# Patient Record
Sex: FEMALE | Race: WHITE | Employment: OTHER | URBAN - METROPOLITAN AREA
[De-identification: names, ages, dates, MRNs, and addresses within clinical notes are randomized per-mention and may not be internally consistent; named-entity substitution may affect disease eponyms.]

---

## 2021-07-28 ENCOUNTER — APPOINTMENT (OUTPATIENT)
Dept: CT IMAGING | Age: 86
DRG: 871 | End: 2021-07-28
Payer: MEDICARE

## 2021-07-28 ENCOUNTER — HOSPITAL ENCOUNTER (INPATIENT)
Age: 86
LOS: 3 days | Discharge: NURSING, REHAB OR LONG-TERM CARE | DRG: 871 | End: 2021-07-31
Attending: PEDIATRICS | Admitting: PEDIATRICS
Payer: MEDICARE

## 2021-07-28 DIAGNOSIS — K81.0 ACUTE CHOLECYSTITIS: Primary | ICD-10-CM

## 2021-07-28 DIAGNOSIS — K85.90 ACUTE PANCREATITIS, UNSPECIFIED COMPLICATION STATUS, UNSPECIFIED PANCREATITIS TYPE: ICD-10-CM

## 2021-07-28 LAB
A/G RATIO: 1.5 (ref 1.1–2.2)
ALBUMIN SERPL-MCNC: 4.6 G/DL (ref 3.4–5)
ALP BLD-CCNC: 84 U/L (ref 40–129)
ALT SERPL-CCNC: 383 U/L (ref 10–40)
ANION GAP SERPL CALCULATED.3IONS-SCNC: 16 MMOL/L (ref 3–16)
AST SERPL-CCNC: 664 U/L (ref 15–37)
BASOPHILS ABSOLUTE: 0 K/UL (ref 0–0.2)
BASOPHILS RELATIVE PERCENT: 0.1 %
BILIRUB SERPL-MCNC: 0.8 MG/DL (ref 0–1)
BILIRUBIN URINE: NEGATIVE
BLOOD, URINE: ABNORMAL
BUN BLDV-MCNC: 24 MG/DL (ref 7–20)
CALCIUM SERPL-MCNC: 9.4 MG/DL (ref 8.3–10.6)
CHLORIDE BLD-SCNC: 95 MMOL/L (ref 99–110)
CLARITY: ABNORMAL
CO2: 22 MMOL/L (ref 21–32)
COLOR: YELLOW
CREAT SERPL-MCNC: 0.8 MG/DL (ref 0.6–1.2)
EOSINOPHILS ABSOLUTE: 0 K/UL (ref 0–0.6)
EOSINOPHILS RELATIVE PERCENT: 0 %
EPITHELIAL CELLS, UA: 1 /HPF (ref 0–5)
GFR AFRICAN AMERICAN: >60
GFR NON-AFRICAN AMERICAN: >60
GLOBULIN: 3.1 G/DL
GLUCOSE BLD-MCNC: 212 MG/DL (ref 70–99)
GLUCOSE URINE: >=1000 MG/DL
HCT VFR BLD CALC: 40.3 % (ref 36–48)
HEMOGLOBIN: 13.6 G/DL (ref 12–16)
HYALINE CASTS: 1 /LPF (ref 0–8)
KETONES, URINE: NEGATIVE MG/DL
LEUKOCYTE ESTERASE, URINE: NEGATIVE
LIPASE: >3000 U/L (ref 13–60)
LYMPHOCYTES ABSOLUTE: 0.5 K/UL (ref 1–5.1)
LYMPHOCYTES RELATIVE PERCENT: 3.6 %
MCH RBC QN AUTO: 31.4 PG (ref 26–34)
MCHC RBC AUTO-ENTMCNC: 33.7 G/DL (ref 31–36)
MCV RBC AUTO: 93.1 FL (ref 80–100)
MICROSCOPIC EXAMINATION: YES
MONOCYTES ABSOLUTE: 0.9 K/UL (ref 0–1.3)
MONOCYTES RELATIVE PERCENT: 6.2 %
NEUTROPHILS ABSOLUTE: 12.8 K/UL (ref 1.7–7.7)
NEUTROPHILS RELATIVE PERCENT: 90.1 %
NITRITE, URINE: NEGATIVE
PDW BLD-RTO: 12.9 % (ref 12.4–15.4)
PH UA: 6.5 (ref 5–8)
PLATELET # BLD: 292 K/UL (ref 135–450)
PMV BLD AUTO: 7.3 FL (ref 5–10.5)
POTASSIUM REFLEX MAGNESIUM: 4 MMOL/L (ref 3.5–5.1)
PROTEIN UA: 30 MG/DL
RBC # BLD: 4.33 M/UL (ref 4–5.2)
RBC UA: 18 /HPF (ref 0–4)
SODIUM BLD-SCNC: 133 MMOL/L (ref 136–145)
SPECIFIC GRAVITY UA: 1.02 (ref 1–1.03)
TOTAL PROTEIN: 7.7 G/DL (ref 6.4–8.2)
URINE REFLEX TO CULTURE: ABNORMAL
URINE TYPE: ABNORMAL
UROBILINOGEN, URINE: 1 E.U./DL
WBC # BLD: 14.2 K/UL (ref 4–11)
WBC UA: 5 /HPF (ref 0–5)

## 2021-07-28 PROCEDURE — 81001 URINALYSIS AUTO W/SCOPE: CPT

## 2021-07-28 PROCEDURE — 85025 COMPLETE CBC W/AUTO DIFF WBC: CPT

## 2021-07-28 PROCEDURE — 2500000003 HC RX 250 WO HCPCS: Performed by: PHYSICIAN ASSISTANT

## 2021-07-28 PROCEDURE — 83690 ASSAY OF LIPASE: CPT

## 2021-07-28 PROCEDURE — 99284 EMERGENCY DEPT VISIT MOD MDM: CPT

## 2021-07-28 PROCEDURE — 1200000000 HC SEMI PRIVATE

## 2021-07-28 PROCEDURE — 96365 THER/PROPH/DIAG IV INF INIT: CPT

## 2021-07-28 PROCEDURE — 74177 CT ABD & PELVIS W/CONTRAST: CPT

## 2021-07-28 PROCEDURE — 96375 TX/PRO/DX INJ NEW DRUG ADDON: CPT

## 2021-07-28 PROCEDURE — 6360000004 HC RX CONTRAST MEDICATION: Performed by: PHYSICIAN ASSISTANT

## 2021-07-28 PROCEDURE — 80053 COMPREHEN METABOLIC PANEL: CPT

## 2021-07-28 PROCEDURE — 6360000002 HC RX W HCPCS: Performed by: PHYSICIAN ASSISTANT

## 2021-07-28 RX ORDER — MECLIZINE HYDROCHLORIDE 25 MG/1
25 TABLET ORAL ONCE
COMMUNITY
End: 2021-07-28

## 2021-07-28 RX ORDER — CIPROFLOXACIN 2 MG/ML
400 INJECTION, SOLUTION INTRAVENOUS ONCE
Status: COMPLETED | OUTPATIENT
Start: 2021-07-28 | End: 2021-07-29

## 2021-07-28 RX ORDER — MORPHINE SULFATE 2 MG/ML
2 INJECTION, SOLUTION INTRAMUSCULAR; INTRAVENOUS ONCE
Status: COMPLETED | OUTPATIENT
Start: 2021-07-28 | End: 2021-07-28

## 2021-07-28 RX ORDER — ACETAMINOPHEN 500 MG
1000 TABLET ORAL EVERY 8 HOURS PRN
COMMUNITY

## 2021-07-28 RX ORDER — TRAMADOL HYDROCHLORIDE 50 MG/1
50 TABLET ORAL 2 TIMES DAILY PRN
Status: ON HOLD | COMMUNITY
End: 2021-07-31 | Stop reason: SDUPTHER

## 2021-07-28 RX ORDER — ALENDRONATE SODIUM 70 MG/1
70 TABLET ORAL
Status: ON HOLD | COMMUNITY
End: 2021-08-26 | Stop reason: HOSPADM

## 2021-07-28 RX ORDER — DIMENHYDRINATE 50 MG/1
50 TABLET ORAL ONCE
COMMUNITY

## 2021-07-28 RX ORDER — ELECTROLYTES/DEXTROSE
1 SOLUTION, ORAL ORAL ONCE
Status: ON HOLD | COMMUNITY
End: 2021-08-26 | Stop reason: HOSPADM

## 2021-07-28 RX ORDER — ONDANSETRON 2 MG/ML
4 INJECTION INTRAMUSCULAR; INTRAVENOUS ONCE
Status: COMPLETED | OUTPATIENT
Start: 2021-07-28 | End: 2021-07-28

## 2021-07-28 RX ORDER — CETIRIZINE HYDROCHLORIDE 10 MG/1
10 TABLET ORAL DAILY
COMMUNITY

## 2021-07-28 RX ADMIN — MORPHINE SULFATE 2 MG: 2 INJECTION, SOLUTION INTRAMUSCULAR; INTRAVENOUS at 20:34

## 2021-07-28 RX ADMIN — ONDANSETRON 4 MG: 2 INJECTION INTRAMUSCULAR; INTRAVENOUS at 20:34

## 2021-07-28 RX ADMIN — IOPAMIDOL 75 ML: 755 INJECTION, SOLUTION INTRAVENOUS at 20:43

## 2021-07-28 RX ADMIN — METRONIDAZOLE 500 MG: 500 INJECTION, SOLUTION INTRAVENOUS at 22:40

## 2021-07-28 RX ADMIN — CIPROFLOXACIN 400 MG: 2 INJECTION, SOLUTION INTRAVENOUS at 23:43

## 2021-07-28 ASSESSMENT — ENCOUNTER SYMPTOMS
ABDOMINAL PAIN: 1
DIARRHEA: 0
NAUSEA: 1
SHORTNESS OF BREATH: 0
COLOR CHANGE: 0
VOMITING: 1

## 2021-07-28 ASSESSMENT — PAIN DESCRIPTION - LOCATION: LOCATION: ABDOMEN

## 2021-07-28 ASSESSMENT — PAIN DESCRIPTION - PAIN TYPE: TYPE: ACUTE PAIN

## 2021-07-28 ASSESSMENT — PAIN SCALES - WONG BAKER: WONGBAKER_NUMERICALRESPONSE: 6

## 2021-07-28 ASSESSMENT — PAIN SCALES - GENERAL
PAINLEVEL_OUTOF10: 5
PAINLEVEL_OUTOF10: 5

## 2021-07-29 ENCOUNTER — APPOINTMENT (OUTPATIENT)
Dept: ULTRASOUND IMAGING | Age: 86
DRG: 871 | End: 2021-07-29
Payer: MEDICARE

## 2021-07-29 LAB
A/G RATIO: 1.7 (ref 1.1–2.2)
ABO/RH: NORMAL
ALBUMIN SERPL-MCNC: 4.1 G/DL (ref 3.4–5)
ALP BLD-CCNC: 74 U/L (ref 40–129)
ALT SERPL-CCNC: 748 U/L (ref 10–40)
ANION GAP SERPL CALCULATED.3IONS-SCNC: 9 MMOL/L (ref 3–16)
ANTIBODY SCREEN: NORMAL
AST SERPL-CCNC: 768 U/L (ref 15–37)
BASOPHILS ABSOLUTE: 0 K/UL (ref 0–0.2)
BASOPHILS RELATIVE PERCENT: 0.3 %
BILIRUB SERPL-MCNC: 0.6 MG/DL (ref 0–1)
BUN BLDV-MCNC: 15 MG/DL (ref 7–20)
CALCIUM SERPL-MCNC: 9.1 MG/DL (ref 8.3–10.6)
CHLORIDE BLD-SCNC: 101 MMOL/L (ref 99–110)
CO2: 28 MMOL/L (ref 21–32)
CREAT SERPL-MCNC: 0.6 MG/DL (ref 0.6–1.2)
EOSINOPHILS ABSOLUTE: 0 K/UL (ref 0–0.6)
EOSINOPHILS RELATIVE PERCENT: 0.2 %
GFR AFRICAN AMERICAN: >60
GFR NON-AFRICAN AMERICAN: >60
GLOBULIN: 2.4 G/DL
GLUCOSE BLD-MCNC: 118 MG/DL (ref 70–99)
HCT VFR BLD CALC: 35.9 % (ref 36–48)
HEMOGLOBIN: 12.3 G/DL (ref 12–16)
INR BLD: 1.03 (ref 0.88–1.12)
LACTIC ACID: 1.3 MMOL/L (ref 0.4–2)
LACTIC ACID: 3.6 MMOL/L (ref 0.4–2)
LIPASE: 728 U/L (ref 13–60)
LYMPHOCYTES ABSOLUTE: 1.2 K/UL (ref 1–5.1)
LYMPHOCYTES RELATIVE PERCENT: 13.7 %
MCH RBC QN AUTO: 31.5 PG (ref 26–34)
MCHC RBC AUTO-ENTMCNC: 34.1 G/DL (ref 31–36)
MCV RBC AUTO: 92.2 FL (ref 80–100)
MONOCYTES ABSOLUTE: 0.7 K/UL (ref 0–1.3)
MONOCYTES RELATIVE PERCENT: 7.8 %
NEUTROPHILS ABSOLUTE: 6.7 K/UL (ref 1.7–7.7)
NEUTROPHILS RELATIVE PERCENT: 78 %
PDW BLD-RTO: 12.5 % (ref 12.4–15.4)
PLATELET # BLD: 257 K/UL (ref 135–450)
PMV BLD AUTO: 7.3 FL (ref 5–10.5)
POTASSIUM REFLEX MAGNESIUM: 3.8 MMOL/L (ref 3.5–5.1)
PROCALCITONIN: 0.23 NG/ML (ref 0–0.15)
PROTHROMBIN TIME: 11.7 SEC (ref 9.9–12.7)
RBC # BLD: 3.9 M/UL (ref 4–5.2)
SODIUM BLD-SCNC: 138 MMOL/L (ref 136–145)
TOTAL PROTEIN: 6.5 G/DL (ref 6.4–8.2)
WBC # BLD: 8.6 K/UL (ref 4–11)

## 2021-07-29 PROCEDURE — 99222 1ST HOSP IP/OBS MODERATE 55: CPT | Performed by: SURGERY

## 2021-07-29 PROCEDURE — 76705 ECHO EXAM OF ABDOMEN: CPT

## 2021-07-29 PROCEDURE — 80053 COMPREHEN METABOLIC PANEL: CPT

## 2021-07-29 PROCEDURE — 6370000000 HC RX 637 (ALT 250 FOR IP): Performed by: INTERNAL MEDICINE

## 2021-07-29 PROCEDURE — 84145 PROCALCITONIN (PCT): CPT

## 2021-07-29 PROCEDURE — 2500000003 HC RX 250 WO HCPCS: Performed by: NURSE PRACTITIONER

## 2021-07-29 PROCEDURE — 86901 BLOOD TYPING SEROLOGIC RH(D): CPT

## 2021-07-29 PROCEDURE — 86900 BLOOD TYPING SEROLOGIC ABO: CPT

## 2021-07-29 PROCEDURE — 6360000002 HC RX W HCPCS: Performed by: NURSE PRACTITIONER

## 2021-07-29 PROCEDURE — 85025 COMPLETE CBC W/AUTO DIFF WBC: CPT

## 2021-07-29 PROCEDURE — 85610 PROTHROMBIN TIME: CPT

## 2021-07-29 PROCEDURE — 94761 N-INVAS EAR/PLS OXIMETRY MLT: CPT

## 2021-07-29 PROCEDURE — 1200000000 HC SEMI PRIVATE

## 2021-07-29 PROCEDURE — 36415 COLL VENOUS BLD VENIPUNCTURE: CPT

## 2021-07-29 PROCEDURE — 83605 ASSAY OF LACTIC ACID: CPT

## 2021-07-29 PROCEDURE — APPSS180 APP SPLIT SHARED TIME > 60 MINUTES: Performed by: NURSE PRACTITIONER

## 2021-07-29 PROCEDURE — 86850 RBC ANTIBODY SCREEN: CPT

## 2021-07-29 PROCEDURE — 2580000003 HC RX 258: Performed by: NURSE PRACTITIONER

## 2021-07-29 PROCEDURE — 83690 ASSAY OF LIPASE: CPT

## 2021-07-29 RX ORDER — SODIUM CHLORIDE 0.9 % (FLUSH) 0.9 %
5-40 SYRINGE (ML) INJECTION PRN
Status: DISCONTINUED | OUTPATIENT
Start: 2021-07-29 | End: 2021-07-31 | Stop reason: HOSPADM

## 2021-07-29 RX ORDER — POLYETHYLENE GLYCOL 3350 17 G/17G
17 POWDER, FOR SOLUTION ORAL DAILY PRN
Status: DISCONTINUED | OUTPATIENT
Start: 2021-07-29 | End: 2021-07-31 | Stop reason: HOSPADM

## 2021-07-29 RX ORDER — CIPROFLOXACIN 2 MG/ML
400 INJECTION, SOLUTION INTRAVENOUS EVERY 12 HOURS
Status: DISCONTINUED | OUTPATIENT
Start: 2021-07-29 | End: 2021-07-31 | Stop reason: HOSPADM

## 2021-07-29 RX ORDER — ONDANSETRON 2 MG/ML
4 INJECTION INTRAMUSCULAR; INTRAVENOUS EVERY 6 HOURS PRN
Status: DISCONTINUED | OUTPATIENT
Start: 2021-07-29 | End: 2021-07-31 | Stop reason: HOSPADM

## 2021-07-29 RX ORDER — SODIUM CHLORIDE 9 MG/ML
25 INJECTION, SOLUTION INTRAVENOUS PRN
Status: DISCONTINUED | OUTPATIENT
Start: 2021-07-29 | End: 2021-07-31 | Stop reason: HOSPADM

## 2021-07-29 RX ORDER — MORPHINE SULFATE 2 MG/ML
2 INJECTION, SOLUTION INTRAMUSCULAR; INTRAVENOUS EVERY 4 HOURS PRN
Status: DISCONTINUED | OUTPATIENT
Start: 2021-07-29 | End: 2021-07-30

## 2021-07-29 RX ORDER — ACETAMINOPHEN 650 MG/1
650 SUPPOSITORY RECTAL EVERY 6 HOURS PRN
Status: DISCONTINUED | OUTPATIENT
Start: 2021-07-29 | End: 2021-07-30

## 2021-07-29 RX ORDER — SODIUM CHLORIDE, SODIUM LACTATE, POTASSIUM CHLORIDE, CALCIUM CHLORIDE 600; 310; 30; 20 MG/100ML; MG/100ML; MG/100ML; MG/100ML
INJECTION, SOLUTION INTRAVENOUS CONTINUOUS
Status: DISCONTINUED | OUTPATIENT
Start: 2021-07-29 | End: 2021-07-31

## 2021-07-29 RX ORDER — ACETAMINOPHEN 325 MG/1
650 TABLET ORAL EVERY 6 HOURS PRN
Status: DISCONTINUED | OUTPATIENT
Start: 2021-07-29 | End: 2021-07-30

## 2021-07-29 RX ORDER — SODIUM CHLORIDE 0.9 % (FLUSH) 0.9 %
5-40 SYRINGE (ML) INJECTION EVERY 12 HOURS SCHEDULED
Status: DISCONTINUED | OUTPATIENT
Start: 2021-07-29 | End: 2021-07-31 | Stop reason: HOSPADM

## 2021-07-29 RX ORDER — QUETIAPINE FUMARATE 100 MG/1
100 TABLET, FILM COATED ORAL NIGHTLY
Status: DISCONTINUED | OUTPATIENT
Start: 2021-07-29 | End: 2021-07-30

## 2021-07-29 RX ORDER — NALOXONE HYDROCHLORIDE 0.4 MG/ML
0.4 INJECTION, SOLUTION INTRAMUSCULAR; INTRAVENOUS; SUBCUTANEOUS PRN
Status: DISCONTINUED | OUTPATIENT
Start: 2021-07-29 | End: 2021-07-31 | Stop reason: HOSPADM

## 2021-07-29 RX ADMIN — SODIUM CHLORIDE, POTASSIUM CHLORIDE, SODIUM LACTATE AND CALCIUM CHLORIDE: 600; 310; 30; 20 INJECTION, SOLUTION INTRAVENOUS at 01:28

## 2021-07-29 RX ADMIN — CIPROFLOXACIN 400 MG: 2 INJECTION, SOLUTION INTRAVENOUS at 09:16

## 2021-07-29 RX ADMIN — QUETIAPINE FUMARATE 100 MG: 100 TABLET ORAL at 20:29

## 2021-07-29 RX ADMIN — ENOXAPARIN SODIUM 40 MG: 40 INJECTION SUBCUTANEOUS at 07:59

## 2021-07-29 RX ADMIN — SODIUM CHLORIDE, POTASSIUM CHLORIDE, SODIUM LACTATE AND CALCIUM CHLORIDE: 600; 310; 30; 20 INJECTION, SOLUTION INTRAVENOUS at 07:57

## 2021-07-29 RX ADMIN — CIPROFLOXACIN 400 MG: 2 INJECTION, SOLUTION INTRAVENOUS at 20:36

## 2021-07-29 RX ADMIN — Medication 10 ML: at 07:59

## 2021-07-29 RX ADMIN — METRONIDAZOLE 500 MG: 500 INJECTION, SOLUTION INTRAVENOUS at 07:58

## 2021-07-29 RX ADMIN — SODIUM CHLORIDE, POTASSIUM CHLORIDE, SODIUM LACTATE AND CALCIUM CHLORIDE: 600; 310; 30; 20 INJECTION, SOLUTION INTRAVENOUS at 22:46

## 2021-07-29 RX ADMIN — METRONIDAZOLE 500 MG: 500 INJECTION, SOLUTION INTRAVENOUS at 16:58

## 2021-07-29 RX ADMIN — Medication 10 ML: at 20:29

## 2021-07-29 RX ADMIN — SODIUM CHLORIDE 25 ML: 9 INJECTION, SOLUTION INTRAVENOUS at 09:15

## 2021-07-29 ASSESSMENT — PAIN SCALES - PAIN ASSESSMENT IN ADVANCED DEMENTIA (PAINAD)
FACIALEXPRESSION: 0
BREATHING: 0
NEGVOCALIZATION: 0
BODYLANGUAGE: 0
BREATHING: 0
BODYLANGUAGE: 0
TOTALSCORE: 0
NEGVOCALIZATION: 0
FACIALEXPRESSION: 0
CONSOLABILITY: 0
BODYLANGUAGE: 0
BREATHING: 0
TOTALSCORE: 0
FACIALEXPRESSION: 0
TOTALSCORE: 0
TOTALSCORE: 0
FACIALEXPRESSION: 0
CONSOLABILITY: 0
CONSOLABILITY: 0
FACIALEXPRESSION: 0
NEGVOCALIZATION: 0
CONSOLABILITY: 0
TOTALSCORE: 0
BODYLANGUAGE: 0
BODYLANGUAGE: 0
NEGVOCALIZATION: 0
BREATHING: 0
BREATHING: 0
NEGVOCALIZATION: 0
CONSOLABILITY: 0

## 2021-07-29 ASSESSMENT — PAIN SCALES - WONG BAKER
WONGBAKER_NUMERICALRESPONSE: 2

## 2021-07-29 ASSESSMENT — PAIN DESCRIPTION - LOCATION: LOCATION: ABDOMEN

## 2021-07-29 ASSESSMENT — PAIN DESCRIPTION - PAIN TYPE: TYPE: ACUTE PAIN

## 2021-07-29 ASSESSMENT — PAIN SCALES - GENERAL
PAINLEVEL_OUTOF10: 0
PAINLEVEL_OUTOF10: 0

## 2021-07-29 NOTE — PLAN OF CARE
Problem: Falls - Risk of:  Goal: Will remain free from falls  Description: Will remain free from falls  Outcome: Ongoing  Goal: Absence of physical injury  Description: Absence of physical injury  Outcome: Ongoing     Problem: Confusion - Acute:  Goal: Absence of continued neurological deterioration signs and symptoms  Description: Absence of continued neurological deterioration signs and symptoms  Outcome: Ongoing  Goal: Mental status will be restored to baseline  Description: Mental status will be restored to baseline  Outcome: Ongoing     Problem: Discharge Planning:  Goal: Ability to perform activities of daily living will improve  Description: Ability to perform activities of daily living will improve  Outcome: Ongoing  Goal: Participates in care planning  Description: Participates in care planning  Outcome: Ongoing     Problem: Injury - Risk of, Physical Injury:  Goal: Will remain free from falls  Description: Will remain free from falls  Outcome: Ongoing  Goal: Absence of physical injury  Description: Absence of physical injury  Outcome: Ongoing     Problem: Mood - Altered:  Goal: Mood stable  Description: Mood stable  Outcome: Ongoing  Goal: Absence of abusive behavior  Description: Absence of abusive behavior  Outcome: Ongoing  Goal: Verbalizations of feeling emotionally comfortable while being cared for will increase  Description: Verbalizations of feeling emotionally comfortable while being cared for will increase  Outcome: Ongoing     Problem: Psychomotor Activity - Altered:  Goal: Absence of psychomotor disturbance signs and symptoms  Description: Absence of psychomotor disturbance signs and symptoms  Outcome: Ongoing     Problem: Sensory Perception - Impaired:  Goal: Demonstrations of improved sensory functioning will increase  Description: Demonstrations of improved sensory functioning will increase  Outcome: Ongoing  Goal: Decrease in sensory misperception frequency  Description: Decrease in sensory misperception frequency  Outcome: Ongoing  Goal: Able to refrain from responding to false sensory perceptions  Description: Able to refrain from responding to false sensory perceptions  Outcome: Ongoing  Goal: Demonstrates accurate environmental perceptions  Description: Demonstrates accurate environmental perceptions  Outcome: Ongoing  Goal: Able to distinguish between reality-based and nonreality-based thinking  Description: Able to distinguish between reality-based and nonreality-based thinking  Outcome: Ongoing  Goal: Able to interrupt nonreality-based thinking  Description: Able to interrupt nonreality-based thinking  Outcome: Ongoing     Problem: Sleep Pattern Disturbance:  Goal: Appears well-rested  Description: Appears well-rested  Outcome: Ongoing     Problem: Infection:  Goal: Will remain free from infection  Description: Will remain free from infection  Outcome: Ongoing     Problem: Safety:  Goal: Free from accidental physical injury  Description: Free from accidental physical injury  Outcome: Ongoing  Goal: Free from intentional harm  Description: Free from intentional harm  Outcome: Ongoing     Problem: Daily Care:  Goal: Daily care needs are met  Description: Daily care needs are met  Outcome: Ongoing     Problem: Pain:  Goal: Patient's pain/discomfort is manageable  Description: Patient's pain/discomfort is manageable  Outcome: Ongoing     Problem: Skin Integrity:  Goal: Skin integrity will stabilize  Description: Skin integrity will stabilize  Outcome: Ongoing     Problem: Discharge Planning:  Goal: Patients continuum of care needs are met  Description: Patients continuum of care needs are met  Outcome: Ongoing     Problem: Breathing Pattern - Ineffective:  Goal: Ability to achieve and maintain a regular respiratory rate will improve  Description: Ability to achieve and maintain a regular respiratory rate will improve  Outcome: Ongoing

## 2021-07-29 NOTE — PROGRESS NOTES
Hospitalist Progress Note      PCP: Shakir Maynard    Date of Admission: 7/28/2021    Chief Complaint: abdominal pain and vomiting    Hospital Course:     Patient is a 80year old female with medical history of dementia that presents to [facility] from her residence in assisted living at [facility] due to complaints of vomiting and abdominal pain. Lipase, AST/ALT, alk phos elevated in ED. WBC elevated with left shift and neutrophils 12.8. CT of abdomen and pelvis indicate gallbladder wall thickening and trace pericholecystic fluid and dilation. Bilateral ovarian cysts noted. General surgery was consulted and recommended admission with IV ciprofloxacin and metronidazole for acute cholecystitis. 7/29- ALT/AST elevated. WBC down to 8.6. Neutrophils down to 6.7. Lactic acidosis resolved to 1.3.   RUQ U/S indicates presence of gallstones in biliary system, with trace pericholecystic fluid. Impression of cholelithiasis and cholecystitis. General surgery consult pending. Subjective:     Patient is alert to self but not time or place. Confused as to why she is in the hospital and why she cannot leave. She would like to be allowed up and moving in order to keep her back pain decreased. States she normally walks all day long at the assisted living. Patients sons explained that she has chronic back pain due to spinal stenosis. Denies abdominal pain, nausea, vomiting, fever, chills, diarrhea, or constipation.      Medications:  Reviewed    Infusion Medications    sodium chloride Stopped (07/30/21 0538)    lactated ringers 100 mL/hr at 07/29/21 7977     Scheduled Medications    sodium chloride flush  5-40 mL Intravenous 2 times per day    enoxaparin  40 mg Subcutaneous Daily    ciprofloxacin  400 mg Intravenous Q12H    metroNIDAZOLE  500 mg Intravenous Q8H    QUEtiapine  100 mg Oral Nightly     PRN Meds: sodium chloride flush, sodium chloride, polyethylene glycol, acetaminophen **OR** acetaminophen, ondansetron, morphine, naloxone      Intake/Output Summary (Last 24 hours) at 7/30/2021 1346  Last data filed at 7/30/2021 0753  Gross per 24 hour   Intake 1535.92 ml   Output 2700 ml   Net -1164.08 ml       Physical Exam Performed:    BP (!) 168/72   Pulse 62   Temp 97.6 °F (36.4 °C) (Oral)   Resp 18   Ht 5' 1\" (1.549 m)   Wt 139 lb 12.4 oz (63.4 kg)   SpO2 93%   BMI 26.41 kg/m²     General appearance: No apparent distress, appears stated age and cooperative. HEENT: Pupils equal, round, and reactive to light. Conjunctivae/corneas clear. Neck: Supple, with full range of motion. No jugular venous distention. Trachea midline. Respiratory:  Normal respiratory effort. Clear to auscultation, bilaterally without Rales/Wheezes/Rhonchi. Cardiovascular: Regular rate and rhythm with normal S1/S2 without murmurs, rubs or gallops. Abdomen: Soft, non-tender, non-distended with normal bowel sounds. Musculoskeletal: No clubbing, cyanosis or edema bilaterally. Full range of motion without deformity. Skin: Skin color, texture, turgor normal.  No rashes or lesions. Neurologic:  Neurovascularly intact without any focal sensory/motor deficits. Cranial nerves: II-XII intact, grossly non-focal.  Psychiatric: Alert and oriented only to self.    Capillary Refill: Brisk,< 3 seconds   Peripheral Pulses: +2 palpable, equal bilaterally       Labs:   Recent Labs     07/28/21 1921 07/29/21  0709 07/30/21  0556   WBC 14.2* 8.6 9.5   HGB 13.6 12.3 13.0   HCT 40.3 35.9* 37.8    257 274     Recent Labs     07/28/21 1921 07/29/21  0709 07/30/21  0556   * 138 138   K 4.0 3.8 3.6   CL 95* 101 102   CO2 22 28 26   BUN 24* 15 10   CREATININE 0.8 0.6 0.6   CALCIUM 9.4 9.1 9.3     Recent Labs     07/28/21 1921 07/29/21  0709 07/30/21  0556   * 768* 192*   * 748* 437*   BILITOT 0.8 0.6 0.6   ALKPHOS 84 74 64     Recent Labs     07/29/21  0709   INR 1.03     No results for input(s): CKTOTAL, TROPONINI in the last 72 hours. Urinalysis:      Lab Results   Component Value Date    NITRU Negative 07/28/2021    WBCUA 5 07/28/2021    RBCUA 18 07/28/2021    BLOODU MODERATE 07/28/2021    SPECGRAV 1.021 07/28/2021    GLUCOSEU >=1000 07/28/2021       Radiology:  [address]   Final Result   Cholelithiasis. There is gallbladder wall thickening and trace   pericholecystic fluid suggesting cholecystitis. Extrahepatic common duct is   borderline dilated. CT ABDOMEN PELVIS W IV CONTRAST Additional Contrast? None   Final Result   Gallbladder wall thickening with suspected trace pericholecystic fluid   suspicious for acute cholecystitis. There are no calcified gallstones. Clinical correlation and follow-up gallbladder ultrasound would be helpful. Moderate-sized hiatal hernia. Bilateral cystic ovarian masses representing either clustered simple cysts or   complex septated cysts measuring up to 7.7 cm on the left and 6.3 cm on   right. After resolution of the acute symptomatology, follow-up pelvic MRI   would be helpful in further characterizing the ovarian masses. 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 Assessment/Plan:    Acute cholecystitis/Cholelithiasis:  - CT indicates gallbladder wall thickening with trace pericholecystic fluid.   - RUQ U/S indicates cholelithiasis with gallbladder wall thickening and pericholecystic fluid. Extrahepatic common bile duct borderline dilated  - WBC 14.2 trended to 8.6  - Lipase >3000 pending repeat  - AST//768 alk phos 84  - patient afebrile, no tachycardia, hypotension.  No abdominal pain  - Ciprofloxacin 400mg and Metronidazole 500mg IV  - Pending general surgery consult     Chronic Back Pain  - likely due to spinal stenosis  - manages with activity at assisted living  - consult for PT/OT to assist patient with ambulation and exercises    DVT Prophylaxis: lovenox   Diet: Adult Oral Nutrition Supplement; Clear Liquid Oral Supplement  ADULT DIET;  Regular; Low Fat/Low Chol/High Fiber/VANDANA  Code Status: DNR-CC    PT/OT Eval Status: Consulted PT/OT    Dispo - inpatient     Alaina Penn MD

## 2021-07-29 NOTE — PROGRESS NOTES
Medication Reconciliation     List of medications patient is currently taking is complete. Source of information:   1. Conversation with patient family at bedside  2. EPIC records        Notes regarding home medications:  1. Patient's son provided medication list. Confident pt received fosamax this morning but unsure of other medications.   Jovon Diallo, Pharmacy Intern 7/28/2021 9:08 PM

## 2021-07-29 NOTE — ED NOTES
Pericare provided to pt, depends changed. Pt tolerated well.       Jennie Quintanilla RN  07/29/21 1746

## 2021-07-29 NOTE — ED NOTES
Pt to room 4118 via stretcher with ED tech. IV in right wrist infusing cipro. No signs of acute distress noted en route.       Dale Villa RN  07/29/21 0020

## 2021-07-29 NOTE — PROGRESS NOTES
Pt's telecam going off, RN into room pt pulled out IV. Alert to self only, hx dementia. Pt cleaned up, new PIV placed to RFA. IV abx started. Denies any pain. Bed alarm on and call light in reach.  Electronically signed by Maritza Bland RN on 7/29/2021 at 8:04 AM

## 2021-07-29 NOTE — CARE COORDINATION
7/29 Patient is from [facility] [place]. She can return to AL or skilled if necessary. Electronically signed by Rosana Lesches, RN on 7/29/2021 at 12:11 PM

## 2021-07-29 NOTE — ED NOTES
Report called to Elaina CHIN RN to assume care, all questions answered. Chapa fall/ pain discussed.      Paresh Moya RN  07/28/21 9023

## 2021-07-29 NOTE — PROGRESS NOTES
Physician Progress Note      Brent Meza  CSN #:                  [id number]  :                       6/15/1930  ADMIT DATE:       2021 7:11 PM  [address] [place] DATE:  [facility]  PROVIDER #:        Gabriela Hargrove MD          QUERY TEXT:    Dear Dr Angela Steiner,    Pt admitted with  Acute cholecystitis . Pt noted to have leukocytosis,   elevated lactic acid, elevated glucose. If possible, please document in the   progress notes and discharge summary if you are evaluating and /or treating   any of the following: The medical record reflects the following:  Risk Factors: Acute cholecystitis  Clinical Indicators: On admission  wbc 14.2, lactic acid 3.6,  glu-212, lipase   >3000. CT abd-Gallbladder wall thickening with suspected trace   pericholecystic fluid  suspicious for acute cholecystitis. ? There are no   calcified gallstones. Treatment: Surgery consult, iv cipro, iv flagyl, ivf, monitor labs    Thank you, [email or web address]  Options provided:  -- Sepsis, present on admission  -- Sepsis, present on admission, now resolved  -- Acute cholecystitis  without Sepsis  -- Other - I will add my own diagnosis  -- Disagree - Not applicable / Not valid  -- Disagree - Clinically unable to determine / Unknown  -- Refer to Clinical Documentation Reviewer    PROVIDER RESPONSE TEXT:    This patient has sepsis which was present on admission. Query created by: Naomi Centeno on 2021 8:45 AM      QUERY TEXT:    Dear Dr Angela Steiner,    Pt admitted with  acute cholecystitis. Pt noted to have elevated lactic acid . If possible, please document in the progress notes and discharge summary if   you are evaluating and/or treating any of the following: The medical record reflects the following:  Risk Factors: acute cholecystitis  Clinical Indicators: On admission lactic acid-3.6 and repeat 1.3  Treatment: ivf, iv abx, monitor labs    Thank You, [email or web address]  Options provided:  -- Metabolic Acidosis  -- Lactic Acidosis  -- Other - I will add my own diagnosis  -- Disagree - Not applicable / Not valid  -- Disagree - Clinically unable to determine / Unknown  -- Refer to Clinical Documentation Reviewer    PROVIDER RESPONSE TEXT:    This patient has lactic acidosis. Query created by:  Naomi Centeno on 7/29/2021 8:48 AM      Electronically signed by:  Gabriela Hargrove MD 7/29/2021 1:40 PM

## 2021-07-29 NOTE — ACP (ADVANCE CARE PLANNING)
Advance Care Planning     Advance Care Planning Activator (Inpatient)  Conversation Note      Date of ACP Conversation: 7/29/2021     Conversation Conducted with: Patient with [address]: Named in Advance Directive or Healthcare Power [facility] (name) Ruslan Cheung    ACP Activator: Clay Corrales RN    Health Care Decision Maker:     Current Designated Health Care Decision Maker:     Primary Decision Maker: Loly Murphy - Child - [phone]    Secondary Decision Maker: Sara Cook - Child - [phone]    Care Preferences    Ventilation: \"If you were in your present state of health and suddenly became very ill and were unable to breathe on your own, what would your preference be about the use of a ventilator (breathing machine) if it were available to you? \"      Would the patient desire the use of ventilator (breathing machine)?: no    \"If your health worsens and it becomes clear that your chance of recovery is unlikely, what would your preference be about the use of a ventilator (breathing machine) if it were available to you? \"     Would the patient desire the use of ventilator (breathing machine)?: No      Resuscitation  \"CPR works best to restart the heart when there is a sudden event, like a heart attack, in someone who is otherwise healthy. Unfortunately, CPR does not typically restart the heart for people who have serious health conditions or who are very sick. \"    \"In the event your heart stopped as a result of an underlying serious health condition, would you want attempts to be made to restart your heart (answer \"yes\" for attempt to resuscitate) or would you prefer a natural death (answer \"no\" for do not attempt to resuscitate)? \" no       [] Yes   [x] No   Educated Patient / China Morales regarding differences between Advance Directives and portable DNR orders.     Length of ACP Conversation in minutes:   10    Conversation Outcomes:  [x] ACP discussion completed  [] Existing advance directive reviewed with patient; no changes to patient's previously recorded wishes  [] New Advance Directive completed  [] Portable Do Not Rescitate prepared for Provider review and signature  [] POLST/POST/MOLST/MOST prepared for Provider review and signature      Follow-up plan:    [] Schedule follow-up conversation to continue planning  [] Referred individual to Provider for additional questions/concerns   [] Advised patient/agent/surrogate to review completed ACP document and update if needed with changes in condition, patient preferences or care setting    [x] This note routed to one or more involved healthcare providers    Electronically signed by Reg Hawk RN on 7/29/2021 at 11:26 AM

## 2021-07-29 NOTE — PLAN OF CARE
Problem: Falls - Risk of:  Goal: Will remain free from falls  Description: Will remain free from falls  7/29/2021 0808 by Yoselyn Boone RN  Outcome: Ongoing     Problem: Falls - Risk of:  Goal: Absence of physical injury  Description: Absence of physical injury  7/29/2021 0808 by Yoselyn Boone RN  Outcome: Ongoing     Problem: Confusion - Acute:  Goal: Absence of continued neurological deterioration signs and symptoms  Description: Absence of continued neurological deterioration signs and symptoms  7/29/2021 0808 by Yoselyn Boone RN  Outcome: Ongoing     Problem: Confusion - Acute:  Goal: Mental status will be restored to baseline  Description: Mental status will be restored to baseline  7/29/2021 0808 by Yoselyn Boone RN  Outcome: Ongoing     Problem: Discharge Planning:  Goal: Ability to perform activities of daily living will improve  Description: Ability to perform activities of daily living will improve  7/29/2021 0808 by Yoselyn Booen RN  Outcome: Ongoing     Problem: Discharge Planning:  Goal: Participates in care planning  Description: Participates in care planning  7/29/2021 0808 by Yoselyn Boone RN  Outcome: Ongoing     Problem: Injury - Risk of, Physical Injury:  Goal: Will remain free from falls  Description: Will remain free from falls  7/29/2021 0808 by Yoselyn Boone RN  Outcome: Ongoing     Problem: Injury - Risk of, Physical Injury:  Goal: Absence of physical injury  Description: Absence of physical injury  7/29/2021 0808 by Yoselyn Boone RN  Outcome: Ongoing     Problem: Mood - Altered:  Goal: Mood stable  Description: Mood stable  7/29/2021 0808 by Yoselyn Boone RN  Outcome: Ongoing     Problem: Mood - Altered:  Goal: Absence of abusive behavior  Description: Absence of abusive behavior  7/29/2021 0808 by Yoselyn Boone RN  Outcome: Ongoing     Problem: Mood - Altered:  Goal: Verbalizations of feeling emotionally comfortable while being cared for will increase  Description: Verbalizations of feeling emotionally comfortable while being cared for will increase  7/29/2021 0808 by Molina Gomez RN  Outcome: Ongoing     Problem: Psychomotor Activity - Altered:  Goal: Absence of psychomotor disturbance signs and symptoms  Description: Absence of psychomotor disturbance signs and symptoms  7/29/2021 0808 by Molina Gomez RN  Outcome: Ongoing     Problem: Sensory Perception - Impaired:  Goal: Demonstrations of improved sensory functioning will increase  Description: Demonstrations of improved sensory functioning will increase  7/29/2021 0808 by Molina Gomez RN  Outcome: Ongoing     Problem: Sensory Perception - Impaired:  Goal: Decrease in sensory misperception frequency  Description: Decrease in sensory misperception frequency  7/29/2021 0808 by Molina Gomez RN  Outcome: Ongoing     Problem: Sensory Perception - Impaired:  Goal: Able to refrain from responding to false sensory perceptions  Description: Able to refrain from responding to false sensory perceptions  7/29/2021 0808 by Molina Gomez RN  Outcome: Ongoing     Problem: Sensory Perception - Impaired:  Goal: Demonstrates accurate environmental perceptions  Description: Demonstrates accurate environmental perceptions  7/29/2021 0808 by Molina Gomez RN  Outcome: Ongoing     Problem: Sensory Perception - Impaired:  Goal: Able to distinguish between reality-based and nonreality-based thinking  Description: Able to distinguish between reality-based and nonreality-based thinking  7/29/2021 0808 by Molina Gomez RN  Outcome: Ongoing     Problem: Sensory Perception - Impaired:  Goal: Able to interrupt nonreality-based thinking  Description: Able to interrupt nonreality-based thinking  7/29/2021 0808 by Molina Gomez RN  Outcome: Ongoing     Problem: Sleep Pattern Disturbance:  Goal: Appears well-rested  Description: Appears well-rested  7/29/2021 0808 by Molina Gomez RN  Outcome: Ongoing     Problem: Infection:  Goal: Will remain free from infection  Description: Will remain free from infection  7/29/2021 0808 by Arminda Martin RN  Outcome: Ongoing     Problem: Safety:  Goal: Free from accidental physical injury  Description: Free from accidental physical injury  7/29/2021 0808 by Arminda Martin RN  Outcome: Ongoing     Problem: Safety:  Goal: Free from intentional harm  Description: Free from intentional harm  7/29/2021 0808 by Arminda Martin RN  Outcome: Ongoing     Problem: Daily Care:  Goal: Daily care needs are met  Description: Daily care needs are met  7/29/2021 2254 by Arminda Martin RN  Outcome: Ongoing     Problem: Pain:  Goal: Patient's pain/discomfort is manageable  Description: Patient's pain/discomfort is manageable  7/29/2021 0808 by Arminda Martin RN  Outcome: Ongoing     Problem: Skin Integrity:  Goal: Skin integrity will stabilize  Description: Skin integrity will stabilize  7/29/2021 0808 by Arminda Martin RN  Outcome: Ongoing     Problem: Discharge Planning:  Goal: Patients continuum of care needs are met  Description: Patients continuum of care needs are met  7/29/2021 0808 by Arminda Martin RN  Outcome: Ongoing

## 2021-07-29 NOTE — ED NOTES
Patient had episode of emesis at this time. No blood noted in emesis. Pt cleaned up and placed in gown. Medication given, see JUAN MIGUEL Guillory RN  07/28/21 2050

## 2021-07-29 NOTE — CONSULTS
General Surgery Consult Note      Jose Ramon Roa   : 6/15/1930 MRN: [id number]  Date of Admission: 2021  Admitting Physician:Yunior Mendoza MD  Primary Care Physician: Ryan Kulkarni 90  Consulting Physician: PEBBLES Liu [place]    Reason for Consult: pancreatitis, abdominal pain    Chief complaint:  Abdominal pain    Diagnosis Present on Admission:   Acute cholecystitis      History of Present Illness  Jose Ramon Roa is a 80 y.o. female admitted on 2021 for abdominal pain. Son is present at bedside to help with HPI as she has some dementia. Lives at a nursing home, but is up and active on a daily basis. History of chronic back pain per son is inoperable due to the location so she is being managed for pain control. Presented from her nursing home regarding vomiting and abdominal pain that has since resolved. Son and patient deny pain like this prior. Lipase was greater than 3000 on admission, pending repeat today. Apparently there was a issue with a piece of laboratory equipment and it is still in process. Ultrasound was done that showed stones, with mild wall thickening and trace pericholecystic fluid. White blood cell count was 14.2 on admission down to 8.6 today. Her LFTs ALT 3 83-7 48 today,  4768, bilirubin has remained normal.    Past Medical History:   Diagnosis Date    Dementia ([address]      History reviewed. No pertinent surgical history. History reviewed. No pertinent family history. Social History     Socioeconomic History    Marital status:       Spouse name: Not on file    Number of children: Not on file    Years of education: Not on file    Highest education level: Not on file   Occupational History    Not on file   Tobacco Use    Smoking status: Never Smoker    Smokeless tobacco: Never Used   Substance and Sexual Activity    Alcohol use: No    Drug use: No    Sexual activity: Not Currently   Other Topics Concern    Not on file   Social History Narrative  Not on file     Social Determinants of Health     Financial Resource Strain:     Difficulty of Paying Living Expenses:    Food Insecurity:     Worried About Running Out of Food in the Last Year:     [address] in the Last Year:    Transportation Needs:     Lack of Transportation (Medical):  Lack of Transportation (Non-Medical):    Physical Activity:     Days of Exercise per Week:     Minutes of Exercise per Session:    Stress:     Feeling of Stress :    Social Connections:     Frequency of Communication with Friends and Family:     Frequency of Social Gatherings with Friends and Family:     Attends Synagogue Services:     Active Member of Clubs or Organizations:     Attends Club or Organization Meetings:     Marital Status:    Intimate Partner Violence:     Fear of Current or Ex-Partner:     Emotionally Abused:     Physically Abused:     Sexually Abused:      No Known Allergies  Prior to Admission medications    Medication Sig Start Date End Date Taking? Authorizing Provider   alendronate (FOSAMAX) 70 MG tablet Take 70 mg by mouth every 7 days on wednesday   Yes Historical Provider, MD   traMADol (ULTRAM) 50 MG tablet Take 50 mg by mouth 2 times daily as needed for Pain.  for moderate pain (4-6)   Yes Historical Provider, MD   calcium carbonate-vitamin D (CALCIUM 600 + D) 600-400 MG-UNIT TABS per tab Take 1 tablet by mouth daily   Yes Historical Provider, MD   cetirizine (ZYRTEC) 10 MG tablet Take 10 mg by mouth daily   Yes Historical Provider, MD   Multiple Vitamin (MULTIVITAMIN ADULT) TABS Take 1 tablet by mouth once daily   Yes Historical Provider, MD   acetaminophen (TYLENOL) 500 MG tablet Take 1,000 mg by mouth every 8 hours as needed for Pain   Yes Historical Provider, MD   dimenhyDRINATE (DRAMAMINE) 50 MG tablet Take 50 mg by mouth once Daily as needed for motion sickness   Yes Historical Provider, MD   fluticasone (VERAMYST) 27.5 MCG/SPRAY nasal spray 1 spray by Each Nostril route daily   Yes Historical Provider, MD       Review of Systems  12 point review of systems was reviewed and negative except for that listed in HPI    Physical Exam  Vitals:    07/29/21 0040 07/29/21 0634 07/29/21 0639 07/29/21 0733   BP: (!) 159/72 (!) 152/62     Pulse: 76 89     Resp: 22 18     Temp: 98.4 °F (36.9 °C) 97.8 °F (36.6 °C)     TempSrc: Oral Oral     SpO2: 95% 92%  94%   Weight: 140 lb 14 oz (63.9 kg)  139 lb 12.4 oz (63.4 kg)    Height: 5' 1\" (1.549 m)            Intake/Output Summary (Last 24 hours) at 7/29/2021 1403  Last data filed at 7/29/2021 1150  Gross per 24 hour   Intake 1288.46 ml   Output --   Net 1288.46 ml       Body mass index is 26.41 kg/m². Genera Appearance: alert, well appearing, and in no distress. Dementia at baseline. HEENT: NCAT, PERRLA, Conjunctiva non injected, no scleral icterus. External ears and nares normal bilaterally. Mucous membranes pink and moist.   Neck: Neck is symmetrical. Trachea appears midline. Lung: Clear to auscultation bilaterally, normal rate and effort   Cardiac: Regular rate and rhythm, S1S2 present, without murmur   Abdomen: Soft, non tender, non distended. Neuro: No gross motor or sensory deficits. Skin: No open wounds or rashes. MSK: normal ROM, no edema  Psych: normal insight, judgment    Labs  Recent Labs     07/28/21 1921 07/29/21  0709   WBC 14.2* 8.6   HGB 13.6 12.3   HCT 40.3 35.9*    257   INR  --  1.03     Recent Labs     07/28/21 1921 07/29/21  0709   * 138   K 4.0 3.8   CL 95* 101   CO2 22 28   BUN 24* 15   GFRAA >60 >60     Recent Labs     07/28/21 1921 07/29/21  0709   GLOB 3.1 2.4   * 768*   * 748*     Invalid input(s): [facility], [facility], [facility], [place], [place], [place], [place], [place], [place], EOS    Imaging  US GALLBLADDER RUQ   Final Result   Cholelithiasis. There is gallbladder wall thickening and trace   pericholecystic fluid suggesting cholecystitis. Extrahepatic common duct is   borderline dilated.          CT clubbing    WBC 14.2 on admission, now down to 8.6  Cr 0.6  Lactic acid 3.6 on presentation, now 1.3  T bili 0.8  AST//748  Lipase >3000 on admission, now down to 72.8    CT abd/pelvis personally reviewed, showing GB wall thickening    RUQ US shows gallstones and trace pericholecystic fluid; extrahepatic CBD slightly dilated    A/P: 81 yo female with dementia now with acute gallstone pancreatitis, possible cholecystitis    IV hydration  With improvement of symptoms, will advance to clears. Continue to trend labs. Discussed with son. Given patient's age and dementia, will hold off on cholecystectomy now. PT/OT  Will follow.     Erika Hand MD    A/P:

## 2021-07-29 NOTE — H&P
Hospital Medicine History & Physical      PCP: Cedric Rhoades    Date of Admission: 7/28/2021    Date of Service: Pt seen/examined on 7/28/2021 and Admitted to Inpatient       Chief Complaint:  Vomting: sent in my ECF: [facility]      History Of Present Illness: The patient is a 80 y.o. female who presents to [facility] with PMHx: Dementia  Resides at [facility]    Patient was sent in from [facility] to be evaluated regarding vomiting. Patient complained of abdominal pain. There is no documented history of surgical procedures or additional known chronic medical conditions. CODE STATUS: [facility]    ED work-up: No evidence of UTI, mild proteinuria, mild microscopic hematuria. Lipase greater than 3000, , . Alk phos 84, total bilirubin 0.8. CBC: Leukocytosis: WBC 14.2, left shift with absolute neutrophils 12.8. CT of the abdomen and pelvis gallbladder wall thickening with trace pericholecystic fluid. Gallbladder dilation. No discussion of CBD dilation. Bilateral ovarian cysts are noted  ED provider consulted with general surgery recommended admission will see patient. On my exam: Family was not present. Patient was awake alert and oriented to person. Not time or place. This is her baseline. She is denying pain or nausea. She is aware that surgeon will see her in the morning and discuss removal of her gallbladder. Patient states \"my gallbladder is bad enough to what is wrong with me then take it out so I can get out of here. \"  Skin is warm and dry. She appears stated age. No acute distress. Chest is clear and abdomen is soft currently nontender. No evidence of icterus or jaundice. Past Medical History:        Diagnosis Date    Dementia [facility])        Past Surgical History:    History reviewed.  No pertinent surgical history. Medications Prior to Admission:    Prior to Admission medications    Medication Sig Start Date End Date Taking? Authorizing Provider   alendronate (FOSAMAX) 70 MG tablet Take 70 mg by mouth every 7 days on wednesday   Yes Historical Provider, MD   traMADol (ULTRAM) 50 MG tablet Take 50 mg by mouth 2 times daily as needed for Pain. for moderate pain (4-6)   Yes Historical Provider, MD   calcium carbonate-vitamin D (CALCIUM 600 + D) 600-400 MG-UNIT TABS per tab Take 1 tablet by mouth daily   Yes Historical Provider, MD   cetirizine (ZYRTEC) 10 MG tablet Take 10 mg by mouth daily   Yes Historical Provider, MD   Multiple Vitamin (MULTIVITAMIN ADULT) TABS Take 1 tablet by mouth once daily   Yes Historical Provider, MD   acetaminophen (TYLENOL) 500 MG tablet Take 1,000 mg by mouth every 8 hours as needed for Pain   Yes Historical Provider, MD   dimenhyDRINATE (DRAMAMINE) 50 MG tablet Take 50 mg by mouth once Daily as needed for motion sickness   Yes Historical Provider, MD   fluticasone (VERAMYST) 27.5 MCG/SPRAY nasal spray 1 spray by Each Nostril route daily   Yes Historical Provider, MD       Allergies:  Patient has no known allergies. Social History:  The patient currently lives [facility]    TOBACCO:   reports that she has never smoked. She has never used smokeless tobacco.  ETOH:   reports no history of alcohol use. Family History:  Reviewed in detail and negative for DM, Early CAD, Cancer, CVA. Positive as follows:    History reviewed. No pertinent family history. REVIEW OF SYSTEMS:   Positive for  and as noted in the HPI. All other systems reviewed and negative. PHYSICAL EXAM:    BP (!) 159/72   Pulse 76   Temp 98.4 °F (36.9 °C) (Oral)   Resp 22   SpO2 95%     General appearance: No apparent distress appears stated age and cooperative. HEENT Normal cephalic, atraumatic without obvious deformity. Pupils equal, round, and reactive to light. Extra ocular muscles intact.   Conjunctivae/corneas clear.  Neck: Supple, No jugular venous distention/bruits. Trachea midline without thyromegaly or adenopathy with full range of motion. Lungs: Clear to auscultation, bilaterally without Rales/Wheezes/Rhonchi with good respiratory effort. Heart: Regular rate and rhythm with Normal S1/S2 without murmurs, rubs or gallops, point of maximum impulse non-displaced  Abdomen: Soft, non-tender or non-distended without rigidity or guarding and positive bowel sounds all four quadrants. Extremities: No clubbing, cyanosis, or edema bilaterally. Full range of motion without deformity and normal gait intact. Skin: Skin color, texture, turgor normal.  No rashes or lesions. Neurologic: Alert and oriented X 1, neurovascularly intact with sensory/motor intact upper extremities/lower extremities, bilaterally. Cranial nerves: II-XII intact, grossly non-focal.  Mental status: Alert, oriented to person, thought content appropriate. Capillary Refill: Acceptable  < 3 seconds  Peripheral Pulses: +3 Easily felt, not easily obliterated with pressure      CXR:  I have reviewed the CXR with the following interpretation: n/a  EKG:  I have reviewed the EKG with the following interpretation:     CBC   Recent Labs     07/28/21 1921   WBC 14.2*   HGB 13.6   HCT 40.3         RENAL  Recent Labs     07/28/21 1921   *   K 4.0   CL 95*   CO2 22   BUN 24*   CREATININE 0.8     LFT'S  Recent Labs     07/28/21 1921   *   *   BILITOT 0.8   ALKPHOS 84     COAG  No results for input(s): INR in the last 72 hours. CARDIAC ENZYMES  No results for input(s): CKTOTAL, CKMB, CKMBINDEX, TROPONINI in the last 72 hours.     U/A:    Lab Results   Component Value Date    COLORU YELLOW 07/28/2021    WBCUA 5 07/28/2021    RBCUA 18 07/28/2021    CLARITYU CLOUDY 07/28/2021    SPECGRAV 1.021 07/28/2021    LEUKOCYTESUR Negative 07/28/2021    BLOODU MODERATE 07/28/2021    GLUCOSEU >=1000 07/28/2021       ABG  No results found for: SNG2RQU, BEART, H5LBPBRJ, PHART, THGBART, HMH7LXD, PO2ART, YBK8SLK        Active Hospital Problems    Diagnosis Date Noted    Acute cholecystitis [K81.0] 2021         PHYSICIANS CERTIFICATION:    I certify that Chilango Ruff is expected to be hospitalized for more than 2 midnights based on the following assessment and plan:      ASSESSMENT/PLAN:  Abdominal pain/vomitin/2-> acute cholecystitis  As evidenced on CT: Gallbladder wall thickening with pericholecystic fluid. No indication of calcified stones. WBC 14.2 w/ left shift: 12.8  Lipase 3000+  Bilirubin 0.8    alk phos 84  Patient is afebrile. No tachycardia. No hypotension. No icterus. No jaundice. On abdominal exam she had no rebound and no guarding. No active nausea. However she has Alzheimer's dementia and is somewhat unreliable in her perception is likely altered. GS consulted in ED: Spoke with Dr. Mini Hernandez: Recommendations will see in a.m. Cipro and Flagyl, STEFANY, n.p.o. Pain management: Low-dose morphine IV push ordered. Antiemetics also ordered      DVT Prophylaxis: Lovenox  Diet: Diet NPO  Code Status: DNR-CC  PT/OT Eval Status:     Dispo -admit, inpatient       Priscilla Arriola, APRN - CNP    Thank you Valdez Garcia for the opportunity to be involved in this patient's care. If you have any questions or concerns please feel free to contact me at [phone].

## 2021-07-29 NOTE — ED PROVIDER NOTES
[address] [facility]      Pt Name: Lanre Gabriel  MRN: [id number]  [place] 6/15/1930  Date of evaluation: 7/28/2021  Provider: FELIPE Pittman    This patient was seen and evaluated by the attending physician No att. providers found. CHIEF COMPLAINT       Chief Complaint   Patient presents with    Abdominal Pain     pt sent from [facility]    Emesis       CRITICAL CARE TIME   I performed a total Critical Care time of 31 minutes, excluding separately reportable procedures. There was a high probability of clinically significant/life threatening deterioration in the patient's condition which required my urgent intervention. Not limited to multiple reexaminations, discussions with attending physician and consultants. HISTORY OF PRESENT ILLNESS  (Location/Symptom, Timing/Onset, Context/Setting, Quality, Duration, Modifying Factors, Severity.)   Lanre Gabriel is a 80 y.o. female who presents to the emergency department via EMS from the [facility]. I contacted the nursing home who advised that the patient had vomiting today. Her sons are at the bedside 1 of whom is the medical power of  states that he believes she had had some vomiting on Saturday. She is complaining of abdominal pain today. She had no prior abdominal surgeries and has no known chronic medical problems with exception of Alzheimer's and history of thoracic back fracture. Pain is 5 out of 10. She had a bowel movement this evening around 445. She has a DNR CC. Nursing Notes were reviewed and I agree. REVIEW OF SYSTEMS    (2-9 systems for level 4, 10 or more for level 5)     Review of Systems   Constitutional: Negative for fever. Respiratory: Negative for shortness of breath. Gastrointestinal: Positive for abdominal pain, nausea and vomiting. Negative for diarrhea. Skin: Negative for color change, rash and wound.    Psychiatric/Behavioral: Negative for agitation and behavioral problems. Unable to perform complete review of systems due to the patient's baseline dementia. PAST MEDICAL HISTORY         Diagnosis Date    Dementia [facility])        SURGICAL HISTORY     History reviewed. No pertinent surgical history. CURRENT MEDICATIONS       Previous Medications    ACETAMINOPHEN (TYLENOL) 500 MG TABLET    Take 1,000 mg by mouth every 8 hours as needed for Pain    ALENDRONATE (FOSAMAX) 70 MG TABLET    Take 70 mg by mouth every 7 days on wednesday    CALCIUM CARBONATE-VITAMIN D (CALCIUM 600 + D) 600-400 MG-UNIT TABS PER TAB    Take 1 tablet by mouth daily    CETIRIZINE (ZYRTEC) 10 MG TABLET    Take 10 mg by mouth daily    DIMENHYDRINATE (DRAMAMINE) 50 MG TABLET    Take 50 mg by mouth once Daily as needed for motion sickness    FLUTICASONE (VERAMYST) 27.5 MCG/SPRAY NASAL SPRAY    1 spray by Each Nostril route daily    MULTIPLE VITAMIN (MULTIVITAMIN ADULT) TABS    Take 1 tablet by mouth once daily    TRAMADOL (ULTRAM) 50 MG TABLET    Take 50 mg by mouth 2 times daily as needed for Pain. for moderate pain (4-6)       ALLERGIES     Patient has no known allergies. FAMILY HISTORY     History reviewed. No pertinent family history. No family status information on file. SOCIAL HISTORY      reports that she has never smoked. She has never used smokeless tobacco. She reports that she does not drink alcohol and does not use drugs. PHYSICAL EXAM    (up to 7 for level 4, 8 or more for level 5)     ED Triage Vitals   BP Temp Temp Source Pulse Resp SpO2 Height Weight   07/28/21 1906 07/28/21 1906 07/28/21 1904 07/28/21 1906 07/28/21 1906 07/28/21 1906 -- --   (!) 180/137 97.4 °F (36.3 °C) Oral 78 18 93 %         Physical Exam  Vitals and nursing note reviewed. Constitutional:       General: She is not in acute distress. Appearance: She is well-developed. HENT:      Head: Normocephalic and atraumatic.    Cardiovascular:      Rate and Rhythm: Normal rate. Pulmonary:      Effort: Pulmonary effort is normal. No respiratory distress. Abdominal:      Tenderness: There is abdominal tenderness in the epigastric area. There is no guarding. Skin:     General: Skin is warm. Neurological:      General: No focal deficit present. Mental Status: She is alert and oriented to person, place, and time. Psychiatric:         Mood and Affect: Mood normal.         Behavior: Behavior normal.         DIAGNOSTIC RESULTS     RADIOLOGY:   Non-plain film images such as CT, Ultrasound and MRI are read by the radiologist. Plain radiographic images are visualized and preliminarily interpreted by FELIPE Connors with the below findings:    Reviewed radiologist's interpretation. Interpretation per the Radiologist below, if available at the time of this note:    CT ABDOMEN PELVIS W IV CONTRAST Additional Contrast? None   Final Result   Gallbladder wall thickening with suspected trace pericholecystic fluid   suspicious for acute cholecystitis. There are no calcified gallstones. Clinical correlation and follow-up gallbladder ultrasound would be helpful. Moderate-sized hiatal hernia. Bilateral cystic ovarian masses representing either clustered simple cysts or   complex septated cysts measuring up to 7.7 cm on the left and 6.3 cm on   right. After resolution of the acute symptomatology, follow-up pelvic MRI   would be helpful in further characterizing the ovarian masses. 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                LABS:  Labs Reviewed   CBC WITH AUTO DIFFERENTIAL - Abnormal; Notable for the following components:       Result Value    WBC 14.2 (*)     Neutrophils Absolute 12.8 (*)     Lymphocytes Absolute 0.5 (*)     All other components within normal limits    Narrative:     Performed at:  [facility]  [address] [place] [address] [facility] [employer] [address]   Phone [phone]   COMPREHENSIVE METABOLIC PANEL W/ REFLEX TO MG FOR LOW K - Abnormal; Notable for the following components:    Sodium 133 (*)     Chloride 95 (*)     Glucose 212 (*)     BUN 24 (*)      (*)      (*)     All other components within normal limits    Narrative:     Performed at:  [address] [facility] [employer] [address]   Phone [phone]   LIPASE - Abnormal; Notable for the following components:    Lipase >3,000.0 (*)     All other components within normal limits    Narrative:     Performed at:  [address] [facility] [employer] [address]   Phone [phone]   URINE RT REFLEX TO CULTURE - Abnormal; Notable for the following components:    Clarity, UA CLOUDY (*)     Glucose, Ur >=1000 (*)     Blood, Urine MODERATE (*)     Protein, UA 30 (*)     All other components within normal limits    Narrative:     Performed at:  [address] [employer] [address]   Phone [phone]   MICROSCOPIC URINALYSIS - Abnormal; Notable for the following components:    RBC, UA 18 (*)     All other components within normal limits    Narrative:     Performed at:  [address] [employer] [address]   Phone [phone]       All other labs were within normal range or not returned as of this dictation. EMERGENCY DEPARTMENT COURSE and DIFFERENTIAL DIAGNOSIS/MDM:   Vitals:    Vitals:    07/28/21 2046 07/28/21 2101 07/28/21 2102 07/28/21 2116   BP: (!) 174/79  (!) 140/55 (!) 158/57   Pulse: 59 94 93 88   Resp: 15 19 22 15   Temp:       TempSrc:       SpO2: 96% 92% 91%      Patient is afebrile not tachycardic. She is having vomiting with some upper abdominal pain on exam.  She has evidence of acute cholecystitis with a lipase that is greater than 3000.   Discussed with the son who

## 2021-07-29 NOTE — PROGRESS NOTES
Hospitalist Progress Note      PCP: Arleth Natarajan    Date of Admission: 7/28/2021    Chief Complaint: abdominal pain and vomiting    Hospital Course:     Patient is a 80year old female with medical history of dementia that presents to [facility] from her residence in assisted living at [facility] due to complaints of vomiting and abdominal pain. Lipase, AST/ALT, alk phos elevated in ED. WBC elevated with left shift and neutrophils 12.8. CT of abdomen and pelvis indicate gallbladder wall thickening and trace pericholecystic fluid and dilation. Bilateral ovarian cysts noted. General surgery was consulted and recommended admission with IV ciprofloxacin and metronidazole for acute cholecystitis. 7/29- ALT/AST elevated. WBC down to 8.6. Neutrophils down to 6.7. Lactic acidosis resolved to 1.3.   RUQ U/S indicates presence of gallstones in biliary system, with trace pericholecystic fluid. Impression of cholelithiasis and cholecystitis. General surgery consult pending. Subjective:     Patient is alert to self but not time or place. Confused as to why she is in the hospital and why she cannot leave. She would like to be allowed up and moving in order to keep her back pain decreased. States she normally walks all day long at the assisted living. Patients sons explained that she has chronic back pain due to spinal stenosis. Denies abdominal pain, nausea, vomiting, fever, chills, diarrhea, or constipation.      Medications:  Reviewed    Infusion Medications    sodium chloride Stopped (07/30/21 0538)    lactated ringers 100 mL/hr at 07/29/21 2693     Scheduled Medications    sodium chloride flush  5-40 mL Intravenous 2 times per day    enoxaparin  40 mg Subcutaneous Daily    ciprofloxacin  400 mg Intravenous Q12H    metroNIDAZOLE  500 mg Intravenous Q8H    QUEtiapine  100 mg Oral Nightly     PRN Meds: sodium chloride flush, sodium chloride, polyethylene glycol, acetaminophen **OR** acetaminophen, ondansetron, morphine, naloxone      Intake/Output Summary (Last 24 hours) at 7/30/2021 0914  Last data filed at 7/30/2021 0753  Gross per 24 hour   Intake 1999.33 ml   Output 2700 ml   Net -700.67 ml       Physical Exam Performed:    BP (!) 207/102   Pulse 89   Temp 98.2 °F (36.8 °C) (Oral)   Resp 20   Ht 5' 1\" (1.549 m)   Wt 139 lb 12.4 oz (63.4 kg)   SpO2 90%   BMI 26.41 kg/m²     General appearance: No apparent distress, appears stated age and cooperative. HEENT: Pupils equal, round, and reactive to light. Conjunctivae/corneas clear. Neck: Supple, with full range of motion. No jugular venous distention. Trachea midline. Respiratory:  Normal respiratory effort. Clear to auscultation, bilaterally without Rales/Wheezes/Rhonchi. Cardiovascular: Regular rate and rhythm with normal S1/S2 without murmurs, rubs or gallops. Abdomen: Soft, non-tender, non-distended with normal bowel sounds. Musculoskeletal: No clubbing, cyanosis or edema bilaterally. Full range of motion without deformity. Skin: Skin color, texture, turgor normal.  No rashes or lesions. Neurologic:  Neurovascularly intact without any focal sensory/motor deficits. Cranial nerves: II-XII intact, grossly non-focal.  Psychiatric: Alert and oriented only to self.    Capillary Refill: Brisk,< 3 seconds   Peripheral Pulses: +2 palpable, equal bilaterally       Labs:   Recent Labs     07/28/21 1921 07/29/21  0709 07/30/21  0556   WBC 14.2* 8.6 9.5   HGB 13.6 12.3 13.0   HCT 40.3 35.9* 37.8    257 274     Recent Labs     07/28/21 1921 07/29/21  0709 07/30/21  0556   * 138 138   K 4.0 3.8 3.6   CL 95* 101 102   CO2 22 28 26   BUN 24* 15 10   CREATININE 0.8 0.6 0.6   CALCIUM 9.4 9.1 9.3     Recent Labs     07/28/21 1921 07/29/21  0709 07/30/21  0556   * 768* 192*   * 748* 437*   BILITOT 0.8 0.6 0.6   ALKPHOS 84 74 64     Recent Labs     07/29/21  0709   INR 1.03     No results for input(s): BENJAMIN, TROPONINI in the last 72 hours. Urinalysis:      Lab Results   Component Value Date    NITRU Negative 07/28/2021    WBCUA 5 07/28/2021    RBCUA 18 07/28/2021    BLOODU MODERATE 07/28/2021    SPECGRAV 1.021 07/28/2021    GLUCOSEU >=1000 07/28/2021       Radiology:  [address]   Final Result   Cholelithiasis. There is gallbladder wall thickening and trace   pericholecystic fluid suggesting cholecystitis. Extrahepatic common duct is   borderline dilated. CT ABDOMEN PELVIS W IV CONTRAST Additional Contrast? None   Final Result   Gallbladder wall thickening with suspected trace pericholecystic fluid   suspicious for acute cholecystitis. There are no calcified gallstones. Clinical correlation and follow-up gallbladder ultrasound would be helpful. Moderate-sized hiatal hernia. Bilateral cystic ovarian masses representing either clustered simple cysts or   complex septated cysts measuring up to 7.7 cm on the left and 6.3 cm on   right. After resolution of the acute symptomatology, follow-up pelvic MRI   would be helpful in further characterizing the ovarian masses. 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 Assessment/Plan:    Acute cholecystitis/Cholelithiasis:  - CT indicates gallbladder wall thickening with trace pericholecystic fluid.   - RUQ U/S indicates cholelithiasis with gallbladder wall thickening and pericholecystic fluid. Extrahepatic common bile duct borderline dilated  - WBC 14.2 trended to 8.6  - Lipase >3000 pending repeat  - AST//768 alk phos 84  - patient afebrile, no tachycardia, hypotension.  No abdominal pain  - Ciprofloxacin 400mg and Metronidazole 500mg IV  - Pending general surgery consult     Sepsis  - present on admission WBC 14.2, lactic acid 3.6, glucose 212, lipase >3000  - now resolved    Chronic Back Pain  - likely due to spinal stenosis  - manages with activity at assisted living  - Tylenol 650mg q6h PRN OR morphine 2mg IV q4h PRN  - consult for PT/OT to assist patient with ambulation and exercises    DVT Prophylaxis: lovenox   Diet: ADULT DIET;  Clear Liquid  Adult Oral Nutrition Supplement; Clear Liquid Oral Supplement  Code Status: DNR-CC    PT/OT Eval Status: Consulted PT/OT    Dispo - inpatient     Larisa Gtz MD

## 2021-07-30 LAB
A/G RATIO: 1.6 (ref 1.1–2.2)
ALBUMIN SERPL-MCNC: 4.1 G/DL (ref 3.4–5)
ALP BLD-CCNC: 64 U/L (ref 40–129)
ALT SERPL-CCNC: 437 U/L (ref 10–40)
ANION GAP SERPL CALCULATED.3IONS-SCNC: 10 MMOL/L (ref 3–16)
AST SERPL-CCNC: 192 U/L (ref 15–37)
BASOPHILS ABSOLUTE: 0 K/UL (ref 0–0.2)
BASOPHILS RELATIVE PERCENT: 0.3 %
BILIRUB SERPL-MCNC: 0.6 MG/DL (ref 0–1)
BUN BLDV-MCNC: 10 MG/DL (ref 7–20)
CALCIUM SERPL-MCNC: 9.3 MG/DL (ref 8.3–10.6)
CHLORIDE BLD-SCNC: 102 MMOL/L (ref 99–110)
CO2: 26 MMOL/L (ref 21–32)
CREAT SERPL-MCNC: 0.6 MG/DL (ref 0.6–1.2)
EOSINOPHILS ABSOLUTE: 0.1 K/UL (ref 0–0.6)
EOSINOPHILS RELATIVE PERCENT: 0.6 %
GFR AFRICAN AMERICAN: >60
GFR NON-AFRICAN AMERICAN: >60
GLOBULIN: 2.6 G/DL
GLUCOSE BLD-MCNC: 138 MG/DL (ref 70–99)
HCT VFR BLD CALC: 37.8 % (ref 36–48)
HEMOGLOBIN: 13 G/DL (ref 12–16)
LIPASE: 39 U/L (ref 13–60)
LYMPHOCYTES ABSOLUTE: 1.1 K/UL (ref 1–5.1)
LYMPHOCYTES RELATIVE PERCENT: 12 %
MCH RBC QN AUTO: 31.6 PG (ref 26–34)
MCHC RBC AUTO-ENTMCNC: 34.4 G/DL (ref 31–36)
MCV RBC AUTO: 92 FL (ref 80–100)
MONOCYTES ABSOLUTE: 0.8 K/UL (ref 0–1.3)
MONOCYTES RELATIVE PERCENT: 8.8 %
NEUTROPHILS ABSOLUTE: 7.4 K/UL (ref 1.7–7.7)
NEUTROPHILS RELATIVE PERCENT: 78.3 %
PDW BLD-RTO: 12.6 % (ref 12.4–15.4)
PLATELET # BLD: 274 K/UL (ref 135–450)
PMV BLD AUTO: 7.5 FL (ref 5–10.5)
POTASSIUM SERPL-SCNC: 3.6 MMOL/L (ref 3.5–5.1)
RBC # BLD: 4.1 M/UL (ref 4–5.2)
SODIUM BLD-SCNC: 138 MMOL/L (ref 136–145)
TOTAL PROTEIN: 6.7 G/DL (ref 6.4–8.2)
WBC # BLD: 9.5 K/UL (ref 4–11)

## 2021-07-30 PROCEDURE — 36415 COLL VENOUS BLD VENIPUNCTURE: CPT

## 2021-07-30 PROCEDURE — 83690 ASSAY OF LIPASE: CPT

## 2021-07-30 PROCEDURE — 2500000003 HC RX 250 WO HCPCS: Performed by: NURSE PRACTITIONER

## 2021-07-30 PROCEDURE — 85025 COMPLETE CBC W/AUTO DIFF WBC: CPT

## 2021-07-30 PROCEDURE — 80053 COMPREHEN METABOLIC PANEL: CPT

## 2021-07-30 PROCEDURE — 94761 N-INVAS EAR/PLS OXIMETRY MLT: CPT

## 2021-07-30 PROCEDURE — 6370000000 HC RX 637 (ALT 250 FOR IP): Performed by: SURGERY

## 2021-07-30 PROCEDURE — 6370000000 HC RX 637 (ALT 250 FOR IP): Performed by: INTERNAL MEDICINE

## 2021-07-30 PROCEDURE — APPSS15 APP SPLIT SHARED TIME 0-15 MINUTES: Performed by: NURSE PRACTITIONER

## 2021-07-30 PROCEDURE — 97162 PT EVAL MOD COMPLEX 30 MIN: CPT

## 2021-07-30 PROCEDURE — 97530 THERAPEUTIC ACTIVITIES: CPT

## 2021-07-30 PROCEDURE — 1200000000 HC SEMI PRIVATE

## 2021-07-30 PROCEDURE — 6360000002 HC RX W HCPCS: Performed by: NURSE PRACTITIONER

## 2021-07-30 PROCEDURE — APPNB15 APP NON BILLABLE TIME 0-15 MINS: Performed by: NURSE PRACTITIONER

## 2021-07-30 PROCEDURE — 2580000003 HC RX 258: Performed by: NURSE PRACTITIONER

## 2021-07-30 PROCEDURE — 97166 OT EVAL MOD COMPLEX 45 MIN: CPT

## 2021-07-30 PROCEDURE — 99232 SBSQ HOSP IP/OBS MODERATE 35: CPT | Performed by: SURGERY

## 2021-07-30 RX ORDER — QUETIAPINE FUMARATE 200 MG/1
200 TABLET, FILM COATED ORAL NIGHTLY
Status: DISCONTINUED | OUTPATIENT
Start: 2021-07-30 | End: 2021-07-31 | Stop reason: HOSPADM

## 2021-07-30 RX ORDER — LORAZEPAM 2 MG/ML
0.5 INJECTION INTRAMUSCULAR ONCE
Status: CANCELLED | OUTPATIENT
Start: 2021-07-30

## 2021-07-30 RX ORDER — ACETAMINOPHEN 500 MG
1000 TABLET ORAL EVERY 8 HOURS SCHEDULED
Status: DISCONTINUED | OUTPATIENT
Start: 2021-07-30 | End: 2021-07-31 | Stop reason: HOSPADM

## 2021-07-30 RX ORDER — DOCUSATE SODIUM 100 MG/1
100 CAPSULE, LIQUID FILLED ORAL 2 TIMES DAILY
Status: DISCONTINUED | OUTPATIENT
Start: 2021-07-30 | End: 2021-07-31 | Stop reason: HOSPADM

## 2021-07-30 RX ADMIN — METRONIDAZOLE 500 MG: 500 INJECTION, SOLUTION INTRAVENOUS at 00:43

## 2021-07-30 RX ADMIN — CIPROFLOXACIN 400 MG: 2 INJECTION, SOLUTION INTRAVENOUS at 08:28

## 2021-07-30 RX ADMIN — ENOXAPARIN SODIUM 40 MG: 40 INJECTION SUBCUTANEOUS at 08:30

## 2021-07-30 RX ADMIN — MORPHINE SULFATE 2 MG: 2 INJECTION, SOLUTION INTRAMUSCULAR; INTRAVENOUS at 08:30

## 2021-07-30 RX ADMIN — QUETIAPINE FUMARATE 200 MG: 200 TABLET ORAL at 20:46

## 2021-07-30 RX ADMIN — Medication 10 ML: at 20:47

## 2021-07-30 RX ADMIN — ACETAMINOPHEN 1000 MG: 500 TABLET ORAL at 20:46

## 2021-07-30 RX ADMIN — SODIUM CHLORIDE, POTASSIUM CHLORIDE, SODIUM LACTATE AND CALCIUM CHLORIDE: 600; 310; 30; 20 INJECTION, SOLUTION INTRAVENOUS at 15:30

## 2021-07-30 RX ADMIN — DOCUSATE SODIUM 100 MG: 100 CAPSULE ORAL at 20:46

## 2021-07-30 RX ADMIN — CIPROFLOXACIN 400 MG: 2 INJECTION, SOLUTION INTRAVENOUS at 20:48

## 2021-07-30 RX ADMIN — METRONIDAZOLE 500 MG: 500 INJECTION, SOLUTION INTRAVENOUS at 10:33

## 2021-07-30 RX ADMIN — METRONIDAZOLE 500 MG: 500 INJECTION, SOLUTION INTRAVENOUS at 15:51

## 2021-07-30 RX ADMIN — ACETAMINOPHEN 1000 MG: 500 TABLET ORAL at 15:33

## 2021-07-30 RX ADMIN — MORPHINE SULFATE 2 MG: 2 INJECTION, SOLUTION INTRAMUSCULAR; INTRAVENOUS at 00:40

## 2021-07-30 ASSESSMENT — PAIN DESCRIPTION - PAIN TYPE: TYPE: ACUTE PAIN

## 2021-07-30 ASSESSMENT — PAIN SCALES - PAIN ASSESSMENT IN ADVANCED DEMENTIA (PAINAD)
NEGVOCALIZATION: 0
CONSOLABILITY: 2
BREATHING: 0
TOTALSCORE: 7
BODYLANGUAGE: 1
FACIALEXPRESSION: 0
TOTALSCORE: 0
BREATHING: 0
CONSOLABILITY: 0
NEGVOCALIZATION: 2
FACIALEXPRESSION: 2
BODYLANGUAGE: 0

## 2021-07-30 ASSESSMENT — PAIN SCALES - GENERAL
PAINLEVEL_OUTOF10: 2
PAINLEVEL_OUTOF10: 6
PAINLEVEL_OUTOF10: 7
PAINLEVEL_OUTOF10: 4

## 2021-07-30 ASSESSMENT — PAIN DESCRIPTION - LOCATION: LOCATION: ABDOMEN

## 2021-07-30 ASSESSMENT — PAIN SCALES - WONG BAKER: WONGBAKER_NUMERICALRESPONSE: 6

## 2021-07-30 NOTE — PLAN OF CARE
Problem: Falls - Risk of:  Goal: Will remain free from falls  Description: Will remain free from falls  7/30/2021 0847 by Enmanuel Maloney RN  Outcome: Ongoing  7/30/2021 0025 by Caryl Foster RN  Outcome: Ongoing  Goal: Absence of physical injury  Description: Absence of physical injury  7/30/2021 0847 by Enmanuel Maloney RN  Outcome: Ongoing  7/30/2021 0025 by Caryl Foster RN  Outcome: Ongoing

## 2021-07-30 NOTE — PLAN OF CARE
Problem: Falls - Risk of:  Goal: Will remain free from falls  Description: Will remain free from falls  Outcome: Ongoing     Problem: Confusion - Acute:  Goal: Absence of continued neurological deterioration signs and symptoms  Description: Absence of continued neurological deterioration signs and symptoms  Outcome: Ongoing     Problem: Discharge Planning:  Goal: Participates in care planning  Description: Participates in care planning  Outcome: Ongoing

## 2021-07-30 NOTE — CARE COORDINATION
Spoke with Reba Machado at [facility], she will confirm, but she believes the patient must by out of restraints for 24 hrs. She advised they do have a skilled bed available and the patient does not require precert. Spoke with son in the room, he is agreeable to this plan for skilled care. Reviewed IMM. He is aware that she must be out of restraints for 24 hrs before she can go to the facility. He will discuss with his sister regarding transportation to the facility as he thinks they would prefer to go via car, but the patient is weak and may need stretcher transport as she is also confused with dementia. He will contact  when they decide. George Diggs RN, BSN, Case Management  Phone: [phone]  Electronically signed by George Diggs RN on 7/30/2021 at 2:33 PM     Spoke with the patient's son, he advised they would like her to transport via stretcher when she is ready for D/C.  HENS done and placed on chart. L/M for Reba Machado at [facility] to check on the restraint policy as well as if the patient will need Covid testing to return. George Diggs RN, BSN, Case Management  Phone: [phone]  Electronically signed by George Diggs RN on 7/30/2021 at 3:33 PM     Spoke with Reba Machado at [facility]. She advised the patient does not need a Covid test to return. She confirmed that the policy is 24 hrs restraint free.     George Diggs RN, BSN, Case Management  Phone: [phone]  Electronically signed by George Diggs RN on 7/30/2021 at 4:14 PM

## 2021-07-30 NOTE — PROGRESS NOTES
Occupational Therapy   Occupational Therapy Initial Assessment  Date: 2021   Patient Name: Isidro Richter  MRN: [id number]     : 6/15/1930    Date of Service: 2021    Discharge Recommendations:  3-5 sessions per week, Patient would benefit from continued therapy after discharge      Isidro Richter scored a  on the  [address] form. Current research shows that an AM-PAC score of 17 or less is typically not associated with a discharge to the patient's home setting. Based on the patient's AM-PAC score and their current ADL deficits, it is recommended that the patient have 3-5 sessions per week of Occupational Therapy at d/c to increase the patient's independence. Please see assessment section for further patient specific details. If patient discharges prior to next session this note will serve as a discharge summary. Please see below for the latest assessment towards goals. Assessment   Performance deficits / Impairments: Decreased functional mobility ; Decreased ADL status; Decreased safe awareness;Decreased cognition;Decreased endurance;Decreased fine motor control;Decreased coordination;Decreased balance  Assessment: Pt functioning below baseline d/t the above deficits, today requiring max A for bed mobility, CGA/min A for sitting balance, and was unable to stand to walker despite assist x2 requiring use of lift equipment (meme stedy ) for fxl transfer. Anticipate pt would require overall max A for ADL needs. Pt unsafe to return to Assisted Living and demonstrates need for ongoing skilled OT at d/c to maximize pt's safety and independence.   Prognosis: Fair  Decision Making: Medium Complexity  OT Education: Plan of Care;OT Role;Transfer Training;Orientation  Barriers to Learning: cognition  REQUIRES OT FOLLOW UP: Yes  Activity Tolerance  Activity Tolerance: Treatment limited secondary to decreased cognition;Patient limited by fatigue;Patient limited by pain  Safety Devices  Safety Devices in place: Yes  Type of devices: Call light within reach; Bed alarm in place; Left in bed;Gait belt           Patient Diagnosis(es): The primary encounter diagnosis was Acute cholecystitis. A diagnosis of Acute pancreatitis, unspecified complication status, unspecified pancreatitis type was also pertinent to this visit. has a past medical history of Dementia ([facility] [address] has no past surgical history on file. Restrictions  Restrictions/Precautions  Restrictions/Precautions: Fall Risk  Position Activity Restriction  Other position/activity restrictions: tele-sitter; B wrist restraints    Subjective   General  Additional Pertinent Hx: Per Mohamud Sadler MD's note 7/29: \"Patient is a 80year old female with medical history of dementia that presents to [facility] from her residence in assisted living at [facility] due to complaints of vomiting and abdominal pain. Lipase, AST/ALT, alk phos elevated in ED. WBC elevated with left shift and neutrophils 12.8. CT of abdomen and pelvis indicate gallbladder wall thickening and trace pericholecystic fluid and dilation. Bilateral ovarian cysts noted. General surgery was consulted and recommended admission with IV ciprofloxacin and metronidazole for acute cholecystitis. 7/29- ALT/AST elevated. WBC down to 8.6. Neutrophils down to 6.7. Lactic acidosis resolved to 1.3. RUQ U/S indicates presence of gallstones in biliary system, with trace pericholecystic fluid. Impression of cholelithiasis and cholecystitis. General surgery consulted. Family / Caregiver Present: Yes (son)  Referring Practitioner: EVON Cali CNP  Subjective  Subjective: Pt met b/s for OT eval/cotx with PT. Pt in bed on arrival, agreeable to participate in therapy. Pt pleasantly confused, oriented to person only. Pt c/o chronic back pain, unable to rate d/t decreased cognition.   Patient Currently in Pain: Yes  Pain Assessment  Pain Level: 7  Vital Signs  Level of Consciousness: Alert (0)  Patient Currently in Pain: Yes  Social/Functional History  Social/Functional History  Type of Home: Facility ([facility])  Home Layout: One level  Home Access: Level entry  Bathroom Shower/Tub: Tub/Shower unit  Bathroom Toilet: Handicap height  Bathroom Equipment: Shower chair, Grab bars in shower, Grab bars around toilet  Bathroom Accessibility: Accessible  Home Equipment: Rolling walker  ADL Assistance: Independent  Homemaking Assistance: Needs assistance (pt walks to dining room for meals, facility manages housekeeping)  Ambulation Assistance: Independent (with RW)  Transfer Assistance: Independent  Active : No  Additional Comments: Son reports pt has 1 falls a week --mostly sliding off bed when getitng dressed. Objective   Vision: Impaired  Vision Exceptions: Wears glasses at all times  Hearing: Within functional limits      Orientation  Overall Orientation Status: Impaired  Orientation Level: Oriented to person;Disoriented to situation;Disoriented to time;Disoriented to place     Balance  Sitting Balance: Contact guard assistance (seated EOB with min A initially progressing to CGA)  Standing Balance: Dependent/Total (attempted to stand to walker with assist x2, however, pt's feet sliding and pt unsteady with tremors. Pt stood in [facility] with min A)  Functional Mobility  Assist Level: Dependent/Total  Functional Mobility Comments: with meme veloz    ADL  LE Dressing: Dependent/Total (to don socks)  Toileting: Dependent/Total (external purewick catheter)  Additional Comments: Anticipate pt min A feeding and grooming, max A bathing and dressing based on ROM, balance, endurance, cognition. Coordination  Movements Are Fluid And Coordinated: No  Coordination and Movement description: Fine motor impairments;Tremors;Right UE;Left UE;Gross motor impairments     Bed mobility  Supine to Sit: Maximum assistance  Sit to Supine:  Moderate assistance;Maximum assistance  Scooting: Maximal assistance;2 Person assistance     Transfers  Sit to stand: Moderate assistance;2 Person assistance (attempted to RW with mod A x2 without success d/t pt's feet sliding forward and unsteady with tremors. Mod A x2 for EOB>meme stedy)  Stand to sit: Moderate assistance;2 Person assistance (meme stedy>EOB)     Cognition  Overall Cognitive Status: Exceptions  Arousal/Alertness: Delayed responses to stimuli  Following Commands: Inconsistently follows commands  Attention Span: Difficulty attending to directions; Difficulty dividing attention  Memory: Decreased recall of biographical Information;Decreased recall of precautions;Decreased recall of recent events;Decreased short term memory;Decreased long term memory  Safety Judgement: Decreased awareness of need for safety  Problem Solving: Decreased awareness of errors;Assistance required to generate solutions;Assistance required to identify errors made  Insights: Decreased awareness of deficits  Initiation: Requires cues for all  Sequencing: Requires cues for all       LUE AROM (degrees)  LUE General AROM: not formally assessed d/t pt not consistently following commands, but observed to be grossly [facility] for purpose of ADLs  --able to reach up and grab meme stedy bar  RUE AROM (degrees)  RUE General AROM: not formally assessed d/t pt not consistently following commands, but observed to be grossly [facility] for purpose of ADLs  --able to reach up and grab meme stedy bar                      Plan   Plan  Times per week: 3-5  Current Treatment Recommendations: Functional Mobility Training, Balance Training, Strengthening, Endurance Training, Safety Education & Training, Cognitive Reorientation, Cognitive/Perceptual Training, Self-Care / ADL, ROM      AM-PAC Score        AM-[facility] Inpatient Daily Activity Raw Score: 12 (07/30/21 1020)  AM-PAC Inpatient ADL T-Scale Score : 30.6 (07/30/21 1020)  ADL Inpatient CMS 0-100% Score: 66.57 (07/30/21 1020)  ADL Inpatient CMS G-Code Modifier : CL (07/30/21 1020)    Goals  Short term goals  Time Frame for Short term goals: Prior to d/c:  Short term goal 1: Pt will complete bathing with mod A. Short term goal 2: Pt will complete dressing with mod A. Short term goal 3: Pt will complete toileting with mod A. Short term goal 4: Pt will complete fxl mobility and fxl transfers to/from ADL surfaces with CGA/min A using AD. Short term goal 5: Pt will tolerate standing >5 minutes for functional task with CGA. Long term goals  Time Frame for Long term goals : STGs=LTGs  Patient Goals   Patient goals : pt unable to verbalize personal therapy goal d/t decreased cognition.        Therapy Time   Individual Concurrent Group Co-treatment   Time In 3503         Time Out 1014         Minutes 35         Timed Code Treatment Minutes: [address] [employer], OTR/L 8772

## 2021-07-30 NOTE — PROGRESS NOTES
anxiety  REQUIRES PT FOLLOW UP: Yes  Activity Tolerance  Activity Tolerance: Patient limited by cognitive status; Other  Activity Tolerance: limited by her anxiety       Patient Diagnosis(es): The primary encounter diagnosis was Acute cholecystitis. A diagnosis of Acute pancreatitis, unspecified complication status, unspecified pancreatitis type was also pertinent to this visit. has a past medical history of Dementia ([facility] [address] has no past surgical history on file. Restrictions  Restrictions/Precautions  Restrictions/Precautions: Fall Risk  Position Activity Restriction  Other position/activity restrictions: tele-sitter; B wrist restraints  Vision/Hearing  Vision: Impaired  Vision Exceptions: Wears glasses at all times  Hearing: Exceptions to [facility]  Hearing Exceptions: Hard of hearing/hearing concerns     Subjective  General  Chart Reviewed: Yes  Additional Pertinent Hx: Per H&P on 7- of EVON Tran CNP: The pt is a 79 yo female who came to the ED from her [place] [address] facility with vomiting and abd pain. General sx consulted: US showed stones, mild wall thickening and trace fluid.           PMHx: dementia  Response To Previous Treatment: Not applicable  Family / Caregiver Present: Yes (son)  Referring Practitioner: Jannell Cogan, APRN - CNP  Referral Date : 07/29/21  Diagnosis: acute gallstone pancreatitis, possible cholecystitis  Follows Commands: Impaired  Subjective  Subjective: the pt was found to be awake in the bed; she presents as confused and anxious; reported she \"always has pain\"  Pain Screening  Patient Currently in Pain: Yes          Orientation  Orientation  Overall Orientation Status: Impaired  Orientation Level: Oriented to person;Disoriented to situation;Disoriented to time;Disoriented to place  Social/Functional History  Social/Functional History  Type of Home: Facility ([facility])  Home Layout: One level  Home Access: Level entry  Bathroom Shower/Tub: Tub/Shower assist  Ambulation  Ambulation?: No     Balance  Sitting - Static: Fair (initially min A due to posterior lean and improving to CGA)  Sitting - Dynamic: Poor (leans posteriorly)  Standing - Static: Fair (in the stedy; poor at the walker)  Standing - Dynamic: Poor  Comments: attempted to come to standing at the walker w/o success due to the pt's feet sliding and her anxiety; the pt did come to standing in the stedy with min/mod A of 1-2        Plan   Plan  Times per week: 3-5x/week  Current Treatment Recommendations: Functional Mobility Training, Transfer Training, Gait Training, Strengthening, Safety Education & Training, Patient/Caregiver Education & Training  Safety Devices  Type of devices: Call light within reach, Bed alarm in place, Telesitter in use, Nurse notified, Gait belt, Patient at risk for falls, Left in bed      AM-PAC Score  AM-PAC Inpatient Mobility Raw Score : 9 (07/30/21 1117)  AM-PAC Inpatient T-Scale Score : 30.55 (07/30/21 1117)  Mobility Inpatient CMS 0-100% Score: 81.38 (07/30/21 1117)  Mobility Inpatient CMS G-Code Modifier : CM (07/30/21 1117)          Goals  Short term goals  Time Frame for Short term goals: upon d/c  Short term goal 1: Bed mobility with CGA. Short term goal 2: Transfers sit <> stand with CGA. Short term goal 3: Ambulate with wheeled walker 25 feet with CGA.   Patient Goals   Patient goals : none stated       Therapy Time   Individual Concurrent Group Co-treatment   Time In 7666         Time Out 1014         Minutes 35         Timed Code Treatment Minutes: 20 Minutes       Electronically signed by Brittany Macias, PT [id number] on 7/30/2021 at 11:18 AM

## 2021-07-30 NOTE — PROGRESS NOTES
Hospitalist Progress Note      PCP: Miley Ramires    Date of Admission: 7/28/2021    Chief Complaint: abdominal pain/vomiting    Hospital Course:     Patient is a 80year old female with medical history of dementia that presents to [facility] from her residence in assisted living at [facility] due to complaints of vomiting and abdominal pain. Lipase, AST/ALT, alk phos elevated in ED. WBC elevated with left shift and neutrophils 12.8. CT of abdomen and pelvis indicate gallbladder wall thickening and trace pericholecystic fluid and dilation. Bilateral ovarian cysts noted. General surgery was consulted and recommended admission with IV ciprofloxacin and metronidazole for acute cholecystitis.      7/29- ALT/AST elevated. WBC down to 8.6. Neutrophils down to 6.7. Lactic acidosis resolved to 1.3.   RUQ U/S indicates presence of gallstones in biliary system, with trace pericholecystic fluid. Impression of cholelithiasis and cholecystitis. 7/30- Lipase down to 39. WBC within normal limits. Patient placed in restraints last night so will need to be 24 hours without restraints before discharge. Subjective:     Patient was agitated last night and this morning. Adamant about wanting to leave. Endorses back pain which is her baseline and son states she receives 1000mg tylenol for this at home. Restraints had to be placed last night because she was attempting to pull out the lines. Has been cooperative today. Son stated that she will be returning to [facility] at discharge but to the skilled nursing facility unit. Patient must remain free of restraints for 24 hours before discharge.        Medications:  Reviewed    Infusion Medications    sodium chloride Stopped (07/30/21 0561)    lactated ringers 100 mL/hr at 07/29/21 1802     Scheduled Medications    sodium chloride flush  5-40 mL Intravenous 2 times per day    enoxaparin  40 mg Subcutaneous Daily    ciprofloxacin  400 mg Intravenous Q12H    metroNIDAZOLE  500 mg Intravenous Q8H    QUEtiapine  100 mg Oral Nightly     PRN Meds: sodium chloride flush, sodium chloride, polyethylene glycol, acetaminophen **OR** acetaminophen, ondansetron, morphine, naloxone      Intake/Output Summary (Last 24 hours) at 7/30/2021 1346  Last data filed at 7/30/2021 0753  Gross per 24 hour   Intake 1535.92 ml   Output 2700 ml   Net -1164.08 ml       Physical Exam Performed:    BP (!) 168/72   Pulse 62   Temp 97.6 °F (36.4 °C) (Oral)   Resp 18   Ht 5' 1\" (1.549 m)   Wt 139 lb 12.4 oz (63.4 kg)   SpO2 93%   BMI 26.41 kg/m²     General appearance: No apparent distress, appears stated age mild confusion and agitation. Dementia at baseline. HEENT: Pupils equal, round, and reactive to light. Conjunctivae/corneas clear. Neck: Supple, with full range of motion. No jugular venous distention. Trachea midline. Respiratory:  Normal respiratory effort. Clear to auscultation, bilaterally without Rales/Wheezes/Rhonchi. Cardiovascular: Regular rate and rhythm with normal S1/S2 without murmurs, rubs or gallops. Abdomen: Soft, non-tender, non-distended with normal bowel sounds. Musculoskeletal: No clubbing, cyanosis or edema bilaterally. Full range of motion without deformity. Skin: Skin color, texture, turgor normal.  No rashes or lesions. Neurologic:  Neurovascularly intact without any focal sensory/motor deficits. Cranial nerves: II-XII intact, grossly non-focal.  Psychiatric: Alert and oriented, impaired insight and judgment.    Capillary Refill: Brisk,< 3 seconds   Peripheral Pulses: +2 palpable, equal bilaterally       Labs:   Recent Labs     07/28/21 1921 07/29/21  0709 07/30/21  0556   WBC 14.2* 8.6 9.5   HGB 13.6 12.3 13.0   HCT 40.3 35.9* 37.8    257 274     Recent Labs     07/28/21 1921 07/29/21  0709 07/30/21  0556   * 138 138   K 4.0 3.8 3.6   CL 95* 101 102   CO2 22 28 26   BUN 24* 15 10   CREATININE 0.8 0.6 0.6   CALCIUM 9.4 9.1 9.3     Recent Labs 07/28/21  1921 07/29/21  0709 07/30/21  0556   * 768* 192*   * 748* 437*   BILITOT 0.8 0.6 0.6   ALKPHOS 84 74 64     Recent Labs     07/29/21  0709   INR 1.03     No results for input(s): CKTOTAL, TROPONINI in the last 72 hours. Urinalysis:      Lab Results   Component Value Date    NITRU Negative 07/28/2021    WBCUA 5 07/28/2021    RBCUA 18 07/28/2021    BLOODU MODERATE 07/28/2021    SPECGRAV 1.021 07/28/2021    GLUCOSEU >=1000 07/28/2021       Radiology:  [address]   Final Result   Cholelithiasis. There is gallbladder wall thickening and trace   pericholecystic fluid suggesting cholecystitis. Extrahepatic common duct is   borderline dilated. CT ABDOMEN PELVIS W IV CONTRAST Additional Contrast? None   Final Result   Gallbladder wall thickening with suspected trace pericholecystic fluid   suspicious for acute cholecystitis. There are no calcified gallstones. Clinical correlation and follow-up gallbladder ultrasound would be helpful. Moderate-sized hiatal hernia. Bilateral cystic ovarian masses representing either clustered simple cysts or   complex septated cysts measuring up to 7.7 cm on the left and 6.3 cm on   right. After resolution of the acute symptomatology, follow-up pelvic MRI   would be helpful in further characterizing the ovarian masses. 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 Assessment/Plan:    Acute cholecystitis/Cholelithiasis:  - CT indicates gallbladder wall thickening with trace pericholecystic fluid.   - RUQ U/S indicates cholelithiasis with gallbladder wall thickening and pericholecystic fluid. Extrahepatic common bile duct borderline dilated  - WBC 14.2 trended to 8.6  - Lipase 39  - ALT//192  - patient afebrile, no tachycardia, hypotension.  No abdominal pain  - Ciprofloxacin 400mg and Metronidazole 500mg IV  - General surgery

## 2021-07-30 NOTE — PROGRESS NOTES
This RN attempeted to contact patient's POA- son Franciscochristy Carolina. No answer.  Will try again in AM.

## 2021-07-30 NOTE — PROGRESS NOTES
Patient increasingly confused. Not easily redirected. Pulling at lines and tubes, and continuously trying to get out of bed. Notified on-call. Placed in bilateral wrist restraints. Will contact family. Will continue to monitor.

## 2021-07-30 NOTE — DISCHARGE INSTR - COC
Continuity of Care Form    Patient Name: Janna Quintero   :  6/15/1930  MRN:  [id number]    Admit date:  2021  Discharge date:  21    Code Status Order: DNR-CC   Advance Directives:      Admitting Physician:  Johnny Teran MD  PCP: Ghada Stroud    Discharging Nurse: DARIAN [facility] Unit/Room#: [facility] Unit Phone Number: [phone]    Emergency Contact:   Extended Emergency Contact Information  Primary Emergency Contact: Santana Corey  Address: [address] [place] [address] Phone: [phone]  Work Phone: [phone]  Relation: Child  Secondary Emergency Contact: Nadjajorge Mehta [address] Phone: [phone]  Relation: Child    Past Surgical History:  History reviewed. No pertinent surgical history. Immunization History: There is no immunization history on file for this patient. Active Problems:  Patient Active Problem List   Diagnosis Code    Acute cholecystitis K81.0       Isolation/Infection:   Isolation            No Isolation          Patient Infection Status       None to display            Nurse Assessment:  Last Vital Signs: BP (!) 168/72   Pulse 62   Temp 97.6 °F (36.4 °C) (Oral)   Resp 18   Ht 5' 1\" (1.549 m)   Wt 139 lb 12.4 oz (63.4 kg)   SpO2 93%   BMI 26.41 kg/m²     Last documented pain score (0-10 scale): Pain Level: 7  Last Weight:   Wt Readings from Last 1 Encounters:   21 139 lb 12.4 oz (63.4 kg)     Mental Status:  disoriented and alert    IV Access:  - None    Nursing Mobility/ADLs:  Walking   Assisted  Transfer  Assisted  Bathing  Dependent  Dressing  Dependent  Toileting  Dependent  Feeding  Assisted  Med Admin  Assisted  Med Delivery   whole    Wound Care Documentation and Therapy:        Elimination:  Continence:    Bowel: No  Bladder: No  Urinary Catheter: None   Colostomy/Ileostomy/Ileal Conduit: No       Date of Last BM: 21    Intake/Output Summary (Last 24 hours) diagnosis listed and that she requires [place] for less 30 days.      Update Admission H&P: No change in H&P    PHYSICIAN SIGNATURE:  Electronically signed by Anand Mendoza MD on 7/31/21 at 1:11 PM EDT

## 2021-07-31 VITALS
HEIGHT: 61 IN | BODY MASS INDEX: 26.31 KG/M2 | HEART RATE: 92 BPM | WEIGHT: 139.33 LBS | DIASTOLIC BLOOD PRESSURE: 64 MMHG | OXYGEN SATURATION: 92 % | RESPIRATION RATE: 16 BRPM | SYSTOLIC BLOOD PRESSURE: 102 MMHG | TEMPERATURE: 98.1 F

## 2021-07-31 LAB
A/G RATIO: 1.4 (ref 1.1–2.2)
ALBUMIN SERPL-MCNC: 3.7 G/DL (ref 3.4–5)
ALP BLD-CCNC: 57 U/L (ref 40–129)
ALT SERPL-CCNC: 272 U/L (ref 10–40)
ANION GAP SERPL CALCULATED.3IONS-SCNC: 13 MMOL/L (ref 3–16)
AST SERPL-CCNC: 66 U/L (ref 15–37)
BASOPHILS ABSOLUTE: 0 K/UL (ref 0–0.2)
BASOPHILS RELATIVE PERCENT: 0.4 %
BILIRUB SERPL-MCNC: 0.5 MG/DL (ref 0–1)
BUN BLDV-MCNC: 10 MG/DL (ref 7–20)
CALCIUM SERPL-MCNC: 8.8 MG/DL (ref 8.3–10.6)
CHLORIDE BLD-SCNC: 102 MMOL/L (ref 99–110)
CO2: 25 MMOL/L (ref 21–32)
CREAT SERPL-MCNC: 0.6 MG/DL (ref 0.6–1.2)
EOSINOPHILS ABSOLUTE: 0.1 K/UL (ref 0–0.6)
EOSINOPHILS RELATIVE PERCENT: 0.9 %
GFR AFRICAN AMERICAN: >60
GFR NON-AFRICAN AMERICAN: >60
GLOBULIN: 2.7 G/DL
GLUCOSE BLD-MCNC: 137 MG/DL (ref 70–99)
HCT VFR BLD CALC: 39.6 % (ref 36–48)
HEMOGLOBIN: 13.5 G/DL (ref 12–16)
LYMPHOCYTES ABSOLUTE: 1.1 K/UL (ref 1–5.1)
LYMPHOCYTES RELATIVE PERCENT: 11.3 %
MCH RBC QN AUTO: 31.5 PG (ref 26–34)
MCHC RBC AUTO-ENTMCNC: 33.9 G/DL (ref 31–36)
MCV RBC AUTO: 92.9 FL (ref 80–100)
MONOCYTES ABSOLUTE: 0.9 K/UL (ref 0–1.3)
MONOCYTES RELATIVE PERCENT: 9.8 %
NEUTROPHILS ABSOLUTE: 7.5 K/UL (ref 1.7–7.7)
NEUTROPHILS RELATIVE PERCENT: 77.6 %
PDW BLD-RTO: 12.6 % (ref 12.4–15.4)
PLATELET # BLD: 252 K/UL (ref 135–450)
PMV BLD AUTO: 7.2 FL (ref 5–10.5)
POTASSIUM SERPL-SCNC: 3.3 MMOL/L (ref 3.5–5.1)
RBC # BLD: 4.26 M/UL (ref 4–5.2)
SODIUM BLD-SCNC: 140 MMOL/L (ref 136–145)
TOTAL PROTEIN: 6.4 G/DL (ref 6.4–8.2)
WBC # BLD: 9.6 K/UL (ref 4–11)

## 2021-07-31 PROCEDURE — 80053 COMPREHEN METABOLIC PANEL: CPT

## 2021-07-31 PROCEDURE — 6360000002 HC RX W HCPCS: Performed by: NURSE PRACTITIONER

## 2021-07-31 PROCEDURE — 2500000003 HC RX 250 WO HCPCS: Performed by: NURSE PRACTITIONER

## 2021-07-31 PROCEDURE — APPSS15 APP SPLIT SHARED TIME 0-15 MINUTES: Performed by: PHYSICIAN ASSISTANT

## 2021-07-31 PROCEDURE — 6370000000 HC RX 637 (ALT 250 FOR IP): Performed by: INTERNAL MEDICINE

## 2021-07-31 PROCEDURE — 85025 COMPLETE CBC W/AUTO DIFF WBC: CPT

## 2021-07-31 PROCEDURE — 94760 N-INVAS EAR/PLS OXIMETRY 1: CPT

## 2021-07-31 PROCEDURE — 6370000000 HC RX 637 (ALT 250 FOR IP): Performed by: SURGERY

## 2021-07-31 PROCEDURE — 36415 COLL VENOUS BLD VENIPUNCTURE: CPT

## 2021-07-31 PROCEDURE — 99232 SBSQ HOSP IP/OBS MODERATE 35: CPT | Performed by: SURGERY

## 2021-07-31 PROCEDURE — 2580000003 HC RX 258: Performed by: NURSE PRACTITIONER

## 2021-07-31 PROCEDURE — APPNB30 APP NON BILLABLE TIME 0-30 MINS: Performed by: PHYSICIAN ASSISTANT

## 2021-07-31 RX ORDER — METRONIDAZOLE 500 MG/1
500 TABLET ORAL 2 TIMES DAILY
Qty: 10 TABLET | Refills: 0 | DISCHARGE
Start: 2021-07-31 | End: 2021-08-05

## 2021-07-31 RX ORDER — CIPROFLOXACIN 500 MG/1
500 TABLET, FILM COATED ORAL 2 TIMES DAILY
Qty: 10 TABLET | Refills: 0 | DISCHARGE
Start: 2021-07-31 | End: 2021-08-05

## 2021-07-31 RX ORDER — TRAMADOL HYDROCHLORIDE 50 MG/1
50 TABLET ORAL 2 TIMES DAILY PRN
Qty: 6 TABLET | Refills: 0 | Status: SHIPPED | OUTPATIENT
Start: 2021-07-31 | End: 2021-08-03

## 2021-07-31 RX ORDER — PSEUDOEPHEDRINE HCL 30 MG
100 TABLET ORAL 2 TIMES DAILY
Qty: 60 CAPSULE | Refills: 0 | DISCHARGE
Start: 2021-07-31

## 2021-07-31 RX ADMIN — SODIUM CHLORIDE 25 ML: 9 INJECTION, SOLUTION INTRAVENOUS at 09:19

## 2021-07-31 RX ADMIN — METRONIDAZOLE 500 MG: 500 INJECTION, SOLUTION INTRAVENOUS at 02:25

## 2021-07-31 RX ADMIN — SODIUM CHLORIDE, POTASSIUM CHLORIDE, SODIUM LACTATE AND CALCIUM CHLORIDE: 600; 310; 30; 20 INJECTION, SOLUTION INTRAVENOUS at 03:33

## 2021-07-31 RX ADMIN — ENOXAPARIN SODIUM 40 MG: 40 INJECTION SUBCUTANEOUS at 09:31

## 2021-07-31 RX ADMIN — DOCUSATE SODIUM 100 MG: 100 CAPSULE ORAL at 09:32

## 2021-07-31 RX ADMIN — ACETAMINOPHEN 1000 MG: 500 TABLET ORAL at 06:40

## 2021-07-31 RX ADMIN — CIPROFLOXACIN 400 MG: 2 INJECTION, SOLUTION INTRAVENOUS at 09:21

## 2021-07-31 RX ADMIN — METRONIDAZOLE 500 MG: 500 INJECTION, SOLUTION INTRAVENOUS at 10:27

## 2021-07-31 ASSESSMENT — PAIN SCALES - PAIN ASSESSMENT IN ADVANCED DEMENTIA (PAINAD)
CONSOLABILITY: 0
BODYLANGUAGE: 0
FACIALEXPRESSION: 0
TOTALSCORE: 0
NEGVOCALIZATION: 0
BREATHING: 0

## 2021-07-31 ASSESSMENT — PAIN SCALES - GENERAL
PAINLEVEL_OUTOF10: 0
PAINLEVEL_OUTOF10: 0

## 2021-07-31 NOTE — PROGRESS NOTES
PIV removed. Dressing clean dry and intact. Pt dc to [facility] via transport. Stretcher to transport pt. Pt dc with personal belongings. Pt son updated. Report called to RN at [facility].

## 2021-07-31 NOTE — PLAN OF CARE
Problem: Falls - Risk of:  Goal: Will remain free from falls  Description: Will remain free from falls  Outcome: Ongoing     Problem: Confusion - Acute:  Goal: Absence of continued neurological deterioration signs and symptoms  Description: Absence of continued neurological deterioration signs and symptoms  Outcome: Ongoing     Problem: Confusion - Acute:  Goal: Mental status will be restored to baseline  Description: Mental status will be restored to baseline  Outcome: Ongoing

## 2021-07-31 NOTE — DISCHARGE SUMMARY
Hospital Medicine Discharge Summary    Patient ID: Viktoria Gillespie      Patient's PCP: Saima Trujillo    Admit Date: 7/28/2021     Discharge Date:   07/31/2021    Admitting Physician: Jovi Siddiqi MD     Discharge Physician: Juan William MD     Discharge Diagnoses: Active Hospital Problems    Diagnosis Date Noted    Acute cholecystitis [K81.0] 07/28/2021       The patient was seen and examined on day of discharge and this discharge summary is in conjunction with any daily progress note from day of discharge. Hospital Course:   Patient is a 80year old female with medical history of dementia that presents to [facility] from her residence in assisted living at [facility] due to complaints of vomiting and abdominal pain. Lipase, AST/ALT, alk phos elevated in ED. WBC elevated with left shift and neutrophils 12.8. CT of abdomen and pelvis indicate gallbladder wall thickening and trace pericholecystic fluid and dilation. Bilateral ovarian cysts noted. General surgery was consulted and recommended admission with IV ciprofloxacin and metronidazole for acute cholecystitis.      7/29- ALT/AST elevated. WBC down to 8.6. Neutrophils down to 6.7. Lactic acidosis resolved to 1.3.   RUQ U/S indicates presence of gallstones in biliary system, with trace pericholecystic fluid. Impression of cholelithiasis and cholecystitis.     7/30- Lipase down to 39. WBC within normal limits. Patient placed in restraints last night so will need to be 24 hours without restraints before discharge. 7/31- patient without restraints overnight. ALT/AST continue to decrease. WBC within normal limits. Acute cholecystitis/Cholelithiasis:  - CT indicates gallbladder wall thickening with trace pericholecystic fluid.   - RUQ U/S indicates cholelithiasis with gallbladder wall thickening and pericholecystic fluid.  Extrahepatic common bile duct borderline dilated  - Ciprofloxacin 400mg and Metronidazole 500mg IV given, discharged with oral antibiotics  - WBC 14.2 trended to 9.6, Lipase 39, ALT//66  - patient afebrile, no tachycardia, hypotension. No abdominal pain  - General surgery indicates no need for cholecystectomy at this time     Sepsis  - present on admission WBC 14.2, lactic acid 3.6, glucose 212, lipase >3000  - now resolved     Dementia  - agitation at night  - Seroquel given 200mg once at night, consider using this at [facility] if needed     Chronic Back Pain  - likely due to spinal stenosis  - manages with activity at assisted living  - Tylenol 1gm tid scheduled  - consult for PT/OT to assist patient with ambulation and exercises    Exam:     /64   Pulse 92   Temp 98.1 °F (36.7 °C) (Oral)   Resp 16   Ht 5' 1\" (1.549 m)   Wt 139 lb 5.3 oz (63.2 kg)   SpO2 92%   BMI 26.33 kg/m²     General appearance: No apparent distress, appears stated age and cooperative. Anxious and restless  HEENT: Pupils equal, round, and reactive to light. Conjunctivae/corneas clear. Neck: Supple, with full range of motion. No jugular venous distention. Trachea midline. Respiratory:  Normal respiratory effort. Clear to auscultation, bilaterally without Rales/Wheezes/Rhonchi. Cardiovascular: Regular rate and rhythm with normal S1/S2 without murmurs, rubs or gallops. Abdomen: Soft, non-tender, non-distended with normal bowel sounds. Musculoskeletal: No clubbing, cyanosis or edema bilaterally. Full range of motion without deformity. Skin: Skin color, texture, turgor normal.  No rashes or lesions. Neurologic:  Neurovascularly intact without any focal sensory/motor deficits. Cranial nerves: II-XII intact, grossly non-focal.  Psychiatric: Alert and oriented, dementia at baseline. Impaired judgement and insight. Consults:     IP CONSULT TO GENERAL SURGERY  IP CONSULT TO HOSPITALIST    Significant Diagnostic Studies:     US GALLBLADDER RUQ   Final Result   Cholelithiasis.   There is gallbladder wall thickening and trace   pericholecystic fluid suggesting cholecystitis. Extrahepatic common duct is   borderline dilated. CT ABDOMEN PELVIS W IV CONTRAST Additional Contrast? None   Final Result   Gallbladder wall thickening with suspected trace pericholecystic fluid   suspicious for acute cholecystitis. There are no calcified gallstones. Clinical correlation and follow-up gallbladder ultrasound would be helpful. Moderate-sized hiatal hernia. Bilateral cystic ovarian masses representing either clustered simple cysts or   complex septated cysts measuring up to 7.7 cm on the left and 6.3 cm on   right. After resolution of the acute symptomatology, follow-up pelvic MRI   would be helpful in further characterizing the ovarian masses. 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 Disposition:  Inpatient     Condition at Discharge: Stable    Discharge Instructions/Follow-up: Continue physical and occupational therapy, finish 5 days of Cipro and Flagyl, consider Seroquel 20 mg nightly for agitation    Code Status:  DNR-CC     Activity: activity as tolerated    Diet: regular diet      Labs:  For convenience and continuity at follow-up the following most recent labs are provided:      CBC:    Lab Results   Component Value Date    WBC 9.6 07/31/2021    HGB 13.5 07/31/2021    HCT 39.6 07/31/2021     07/31/2021       Renal:    Lab Results   Component Value Date     07/31/2021    K 3.3 07/31/2021    K 3.8 07/29/2021     07/31/2021    CO2 25 07/31/2021    BUN 10 07/31/2021    CREATININE 0.6 07/31/2021    CALCIUM 8.8 07/31/2021       Discharge Medications:     Current Discharge Medication List           Details   docusate sodium (COLACE, DULCOLAX) 100 MG CAPS Take 100 mg by mouth 2 times daily  Qty: 60 capsule, Refills: 0      ciprofloxacin (CIPRO) 500 MG tablet Take 1 tablet by mouth 2 times daily for 5 days  Qty: 10 tablet, Refills: 0      metroNIDAZOLE (FLAGYL) 500 MG tablet Take 1 tablet by mouth 2 times daily for 5 days  Qty: 10 tablet, Refills: 0              Details   traMADol (ULTRAM) 50 MG tablet Take 1 tablet by mouth 2 times daily as needed for Pain for up to 3 days. for moderate pain (4-6)  Qty: 6 tablet, Refills: 0    Comments: Reduce doses taken as pain becomes manageable  Associated Diagnoses: Acute cholecystitis              Details   alendronate (FOSAMAX) 70 MG tablet Take 70 mg by mouth every 7 days on wednesday      calcium carbonate-vitamin D (CALCIUM 600 + D) 600-400 MG-UNIT TABS per tab Take 1 tablet by mouth daily      cetirizine (ZYRTEC) 10 MG tablet Take 10 mg by mouth daily      Multiple Vitamin (MULTIVITAMIN ADULT) TABS Take 1 tablet by mouth once daily      acetaminophen (TYLENOL) 500 MG tablet Take 1,000 mg by mouth every 8 hours as needed for Pain      dimenhyDRINATE (DRAMAMINE) 50 MG tablet Take 50 mg by mouth once Daily as needed for motion sickness      fluticasone (VERAMYST) 27.5 MCG/SPRAY nasal spray 1 spray by Each Nostril route daily             No future appointments. Time Spent on discharge is more than 30 minutes in the examination, evaluation, counseling and review of medications and discharge plan. Signed:    Nemo Rooney MD   7/31/2021      Thank you Janee Stockton for the opportunity to be involved in this patient's care. If you have any questions or concerns please feel free to contact me at [phone].

## 2021-07-31 NOTE — CARE COORDINATION
Discharge order acknowledged. Called to [facility]. Spoke with RN, Rah Caro. Notified of patient's return at 1:00PM today. Called to [place] [phone]. Verified discharge plan and notified of transport time. Spoke with RN, Vasyl Blake. Notified of transport time.     CASE MANAGEMENT DISCHARGE SUMMARY:    DISCHARGE DATE: 7/31/2021    DISCHARGED TO: [facility]                REPORT NUMBER:  [phone] (rehab floor)              FAX NUMBER: [phone]    TRANSPORTATION: [facility] ([id number])             TIME: 1:00 PM     PREFERRED PHARMACY: at facility     INSURANCE PRECERT OBTAINED: yes    HENS/PASAAR COMPLETED: yes, 7/30    PHILLIP Updated    PETER James, ADELAIDEW, Social Work/Case Management   [phone]  Electronically signed by PETER James, WILLI on 7/31/2021 at 10:52 AM

## 2021-07-31 NOTE — PROGRESS NOTES
[facility] [phone]                                     Daily Progress Note                                                         Pt Name: Hany Gaytan  Medical Record Number: [id number]  Date of Birth 6/15/1930   Today's Date: 7/31/2021  Chief Complaint   Patient presents with    Abdominal Pain     pt sent from [facility]    Emesis        ASSESSMENT/PLAN  -79 yo female with dementia now with acute gallstone pancreatitis, possible cholecystitis    -IV hydration  -With improvement of symptoms, advance diet as tolerated. - Given patient's age and dementia, will hold off on cholecystectomy now. -OK to D/C back to [facility] from a general surgery standpoint on PO antibiotics  -PT/OT   SUBJECTIVE  Yoav Hyman is resting in bed. Confusion as per baseline. OBJECTIVE  VITALS:  height is 5' 1\" (1.549 m) and weight is 139 lb 5.3 oz (63.2 kg). Her oral temperature is 98.1 °F (36.7 °C). Her blood pressure is 102/64 and her pulse is 92. Her respiration is 16 and oxygen saturation is 92%. INTAKE/OUTPUT:      Intake/Output Summary (Last 24 hours) at 7/31/2021 1124  Last data filed at 7/31/2021 1033  Gross per 24 hour   Intake 1670.56 ml   Output 1000 ml   Net 670.56 ml     Genera Appearance: alert, well appearing, and in no distress. Dementia at baseline. HEENT: NCAT, PERRLA, Conjunctiva non injected, no scleral icterus. External ears and nares normal bilaterally. Mucous membranes pink and moist.   Neck: Neck is symmetrical. Trachea appears midline. Lung: Clear to auscultation bilaterally, normal rate and effort   Cardiac: Regular rate and rhythm, S1S2 present, without murmur   Abdomen: Soft, periumbilical tenderness, non distended. Neuro: No gross motor or sensory deficits. Skin: No open wounds or rashes. MSK: normal ROM, no edema  Psych: normal insight, judgment  I/O last 3 completed shifts: In: 1770.3 [P.O.:1020;  I.V.:69; IV Piggyback:681.3]  Out: 1600 [Urine:1600]      LABS  Recent Labs     07/28/21  1921 07/28/21 2021 07/29/21  0709 07/30/21  0556 07/31/21  0549   WBC   < >  --  8.6   < > 9.6   HGB   < >  --  12.3   < > 13.5   HCT   < >  --  35.9*   < > 39.6   PLT   < >  --  257   < > 252   NA   < >  --  138   < > 140   K   < >  --  3.8   < > 3.3*   CL   < >  --  101   < > 102   CO2   < >  --  28   < > 25   BUN   < >  --  15   < > 10   CREATININE   < >  --  0.6   < > 0.6   CALCIUM   < >  --  9.1   < > 8.8   INR  --   --  1.03  --   --    AST   < >  --  768*   < > 66*   ALT   < >  --  748*   < > 272*   BILITOT   < >  --  0.6   < > 0.5   NITRU  --  Negative  --   --   --    COLORU  --  YELLOW  --   --   --     < > = values in this interval not displayed. EDUCATION  Patient educated about Disease Process, Medications, Smoking Cessation, Oxygenation, Incentive Spirometry and Deep Breath and Cough, Diabetes, Hyperlipidemia, Smoking Cessation, Nutrition, Exercise and Hypertension    Electronically signed by FELIPE Terry on 7/31/2021 at 11:24 AM      [facility]   [phone] Office  [phone]     Agree with above note. The patient was personally seen and examined. Chilango Ruff is doing well. Denies any abdominal pain. Denies nausea.     Abd soft, NT, ND    WBC 9.6  Cr 0.6    A/P: 81 yo female with hx of dementia, now with acute gallstone pancreatitis and cholecystitis    Doing well  Labs normalized  Ok for discharge from surgical standpoint  Continue oral antibiotics x 5 days  Follow up in 2 weeks, call for appointment    Jeimy Fowler MD

## 2021-08-18 ENCOUNTER — HOSPITAL ENCOUNTER (EMERGENCY)
Age: 86
Discharge: HOME | End: 2021-08-18
Payer: MEDICARE

## 2021-08-18 ENCOUNTER — APPOINTMENT (OUTPATIENT)
Dept: GENERAL RADIOLOGY | Age: 86
End: 2021-08-18
Payer: MEDICARE

## 2021-08-18 VITALS
DIASTOLIC BLOOD PRESSURE: 74 MMHG | RESPIRATION RATE: 17 BRPM | TEMPERATURE: 98.4 F | HEART RATE: 70 BPM | SYSTOLIC BLOOD PRESSURE: 187 MMHG | OXYGEN SATURATION: 100 %

## 2021-08-18 DIAGNOSIS — S62.101A RIGHT WRIST FRACTURE, CLOSED, INITIAL ENCOUNTER: Primary | ICD-10-CM

## 2021-08-18 PROCEDURE — 6370000000 HC RX 637 (ALT 250 FOR IP): Performed by: PHYSICIAN ASSISTANT

## 2021-08-18 PROCEDURE — 73110 X-RAY EXAM OF WRIST: CPT

## 2021-08-18 PROCEDURE — 99283 EMERGENCY DEPT VISIT LOW MDM: CPT

## 2021-08-18 RX ORDER — HYDROCODONE BITARTRATE AND ACETAMINOPHEN 5; 325 MG/1; MG/1
1 TABLET ORAL ONCE
Status: COMPLETED | OUTPATIENT
Start: 2021-08-18 | End: 2021-08-18

## 2021-08-18 RX ADMIN — HYDROCODONE BITARTRATE AND ACETAMINOPHEN 1 TABLET: 5; 325 TABLET ORAL at 17:18

## 2021-08-18 ASSESSMENT — PAIN SCALES - PAIN ASSESSMENT IN ADVANCED DEMENTIA (PAINAD)
CONSOLABILITY: 1
NEGVOCALIZATION: 0
BODYLANGUAGE: 0
BREATHING: 0

## 2021-08-18 NOTE — ED TRIAGE NOTES
Pt fell this am around 0545, landing on right wrist. xrays obtained at facility and pt sent to ed due to broken wrist. Pulses present to extremity,sensation intact.

## 2021-08-18 NOTE — ED NOTES
Pt was hypoxic at triage; pt was 87% on RA. Pt was placed on 2L of NC, O2 saturation 98%.       Edward Newby RN  08/18/21 0802

## 2021-08-19 NOTE — ED PROVIDER NOTES
[address]       Pt Name: Jeffrey Gomez  MRN: [id number]  [place] 6/15/1930  Date of evaluation: 8/18/2021  Provider: FELIPE Daniel    The Attending Physician was available for consultation but did not see or evaluate this patient. CHIEF COMPLAINT       Chief Complaint   Patient presents with    Wrist Injury     non witness fall; per family the pt slipped out of bed and used her right arm to break fall. broke right wrist       HISTORY OF PRESENT ILLNESS  (Location/Symptom, Timing/Onset, Context/Setting, Quality, Duration, Modifying Factors, Severity.)   Jeffrey Gomez is a 80 y.o. female who presents to the emergency department with complaint of right wrist injury. Patient comes from a nursing home, has dementia, cannot meaningfully contribute to this HPI. Her son at bedside reports that nursing home staff heard the patient fall the floor early this morning as she is trying to get up and use the bathroom, and then they noticed swelling in her right wrist.  There was no suspicion for head trauma. Patient denies pain anywhere other than her wrist.  Patient's son denies any prior history of fracture or surgery in the affected area that he knows of. No other complaints. Nursing Notes were reviewed and I agree. REVIEW OF SYSTEMS    (2-9 systems for level 4, 10 or more for level 5)     Constitutional:  Negative for fever, chills. Respiratory:  Negative for cough, shortness of breath. Cardiovascular:  Negative for chest pain, palpitations. Gastrointestinal:  Negative for vomiting, abdominal pain. Musculoskeletal: Positive for right wrist pain and swelling. Negative for myalgias, neck pain or stiffness. Neurological:  Negative for dizziness, focal weakness, numbness. All other positives and pertinent negatives as per HPI.       PAST MEDICAL HISTORY         Diagnosis Date    Dementia [facility])        SURGICAL HISTORY     No past surgical history on file. CURRENT MEDICATIONS       Discharge Medication List as of 8/18/2021  6:14 PM      CONTINUE these medications which have NOT CHANGED    Details   docusate sodium (COLACE, DULCOLAX) 100 MG CAPS Take 100 mg by mouth 2 times daily, Disp-60 capsule, R-0DC to SNF      alendronate (FOSAMAX) 70 MG tablet Take 70 mg by mouth every 7 days on wednesdayHistorical Med      calcium carbonate-vitamin D (CALCIUM 600 + D) 600-400 MG-UNIT TABS per tab Take 1 tablet by mouth dailyHistorical Med      cetirizine (ZYRTEC) 10 MG tablet Take 10 mg by mouth dailyHistorical Med      Multiple Vitamin (MULTIVITAMIN ADULT) TABS Take 1 tablet by mouth once dailyHistorical Med      acetaminophen (TYLENOL) 500 MG tablet Take 1,000 mg by mouth every 8 hours as needed for PainHistorical Med      dimenhyDRINATE (DRAMAMINE) 50 MG tablet Take 50 mg by mouth once Daily as needed for motion sicknessHistorical Med      fluticasone (VERAMYST) 27.5 MCG/SPRAY nasal spray 1 spray by Each Nostril route dailyHistorical Med             ALLERGIES     Patient has no known allergies. FAMILY HISTORY     No family history on file. No family status information on file. SOCIAL HISTORY      reports that she has never smoked. She has never used smokeless tobacco. She reports that she does not drink alcohol and does not use drugs. PHYSICAL EXAM    (up to 7 for level 4, 8 or more for level 5)     ED Triage Vitals [08/18/21 1632]   BP Temp Temp Source Pulse Resp SpO2 Height Weight   (!) 162/78 98.4 °F (36.9 °C) Temporal 74 16 (!) 88 % -- --       Constitutional:  Appearing well-developed and well-nourished. No distress. HEENT: No evidence of trauma, no tenderness to palpation of the facial bones, negative for raccoon eyes or florian sign. Cardiovascular:  Normal rate, regular rhythm, normal heart sounds and intact distal pulses. Pulmonary/Chest:  Effort normal and breath sounds normal. No respiratory distress.    Musculoskeletal: There is moderate swelling noted to the radial aspect of the right wrist, tender to palpation, with pain reported any attempts of range of motion to the wrist.  Normal examination of the right hand otherwise. 2+ radial pulse on the right. Sensation to light touch grossly intact and capillary refill <3 seconds in the digits of the right upper extremity. Normal examination of the joints of both lower extremities, with good range of motion, no bony tenderness. Neurological:  Oriented to person, place, and time. No cranial nerve deficit observed. DIAGNOSTIC RESULTS     When ordered, EKGs are interpreted by the ED physician in the absence of a cardiologist. Please the physician's note for interpretation of EKG. RADIOLOGY:     Interpretation per the Radiologist below, if available at the time of this note:    XR WRIST RIGHT (MIN 3 VIEWS)   Final Result   1. Comminuted, articular, impacted, essentially nondisplaced distal radial   metaphysis fracture. 2. No discrete fracture evident at the distal ulna, involving the carpal   bones or metacarpal bones. 3.  Soft tissue edema, presumably reactive edema, contusion and/or sprain. 4. Bones are osteoporotic. 5. Osteoarthritis. LABS:  Labs Reviewed - No data to display    All other labs were within normal range or not returned as of this dictation. EMERGENCY DEPARTMENT COURSE and DIFFERENTIAL DIAGNOSIS/MDM:   Vitals:    Vitals:    08/18/21 1632 08/18/21 1759   BP: (!) 162/78 (!) 187/74   Pulse: 74 70   Resp: 16 17   Temp: 98.4 °F (36.9 °C)    TempSrc: Temporal    SpO2: (!) 88% 100%       The patient's condition in the ED was good. Physical exam showed no evidence of injury other than for the right wrist.  X-ray revealed a distal radius fracture, consistent with exam.  Patient was given an Ortho-Glass splint in the ED, with good neurovascular status in the extremity both before and after splinting.   There was no indication for hospitalization or further workup. Patient takes tramadol regularly at the nursing home. She will be discharged with a referral for orthopedic follow-up care. The patient's son and power of  at bedside verbalized understanding and agreement with this plan of care. The patient was advised to return to the emergency department if symptoms should significantly worsen or if new and concerning symptoms should appear. I estimate there is LOW risk for COMPARTMENT SYNDROME, DEEP VENOUS THROMBOSIS, SEPTIC ARTHRITIS, NEUROVASCULAR COMPROMISE, or TENDON/LIGAMENT RUPTURE, thus I consider the discharge disposition reasonable. PROCEDURES:  None    FINAL IMPRESSION      1.  Right wrist fracture, closed, initial encounter          DISPOSITION/PLAN   DISPOSITION Decision To Discharge 08/18/2021 05:43:55 PM      PATIENT REFERRED TO:  [facility]  [address] [place] [address] [place] [address] [phone]  Schedule an appointment as soon as possible for a visit   For follow-up care      DISCHARGE MEDICATIONS:  Discharge Medication List as of 8/18/2021  6:14 PM          (Please note that portions of this note were completed with a voice recognition program.  Efforts were made to edit the dictations but occasionally words are mis-transcribed.)    Lashonda Moore, [address] [place], [place]  08/18/21 1583

## 2021-08-20 ENCOUNTER — OFFICE VISIT (OUTPATIENT)
Dept: ORTHOPEDIC SURGERY | Age: 86
End: 2021-08-20
Payer: MEDICARE

## 2021-08-20 VITALS — RESPIRATION RATE: 18 BRPM | BODY MASS INDEX: 26.24 KG/M2 | HEIGHT: 61 IN | WEIGHT: 139 LBS

## 2021-08-20 DIAGNOSIS — S52.571A OTHER CLOSED INTRA-ARTICULAR FRACTURE OF DISTAL END OF RIGHT RADIUS, INITIAL ENCOUNTER: Primary | ICD-10-CM

## 2021-08-20 PROCEDURE — 1111F DSCHRG MED/CURRENT MED MERGE: CPT | Performed by: ORTHOPAEDIC SURGERY

## 2021-08-20 PROCEDURE — 25600 CLTX DST RDL FX/EPHYS SEP WO: CPT | Performed by: ORTHOPAEDIC SURGERY

## 2021-08-20 PROCEDURE — 1036F TOBACCO NON-USER: CPT | Performed by: ORTHOPAEDIC SURGERY

## 2021-08-20 PROCEDURE — 4040F PNEUMOC VAC/ADMIN/RCVD: CPT | Performed by: ORTHOPAEDIC SURGERY

## 2021-08-20 PROCEDURE — G8417 CALC BMI ABV UP PARAM F/U: HCPCS | Performed by: ORTHOPAEDIC SURGERY

## 2021-08-20 PROCEDURE — 1123F ACP DISCUSS/DSCN MKR DOCD: CPT | Performed by: ORTHOPAEDIC SURGERY

## 2021-08-20 PROCEDURE — 99203 OFFICE O/P NEW LOW 30 MIN: CPT | Performed by: ORTHOPAEDIC SURGERY

## 2021-08-20 PROCEDURE — G8427 DOCREV CUR MEDS BY ELIG CLIN: HCPCS | Performed by: ORTHOPAEDIC SURGERY

## 2021-08-20 PROCEDURE — 1090F PRES/ABSN URINE INCON ASSESS: CPT | Performed by: ORTHOPAEDIC SURGERY

## 2021-08-20 NOTE — PROGRESS NOTES
[place] [address] [facility]  Office Visit    Chief Complaint: Right wrist injury    HPI:  Ailyn Malin is a 80 y.o. who is here for initial evaluation of right wrist injury. She lives in a nursing facility and fell out of her bed early morning 2 days ago. She was seen in the emergency department and diagnosed with a right distal radius fracture. She was placed in a splint. She is in the nursing facility for dementia. She has been trying to remove her splint. She denies other areas of pain. Patient Active Problem List   Diagnosis    Acute cholecystitis       ROS:  Constitutional: denies fever, chills, weight loss  MSK: denies pain in other joints, muscle aches  Neurological: denies numbness, tingling, weakness    Exam:  Resp. rate 18, height 5' 1\" (1.549 m), weight 139 lb (63 kg). Appearance: sitting in wheelchair, appears to be in no acute distress, awake and alert  Resp: unlabored breathing on room air  Skin: warm, dry and intact with out erythema or significant increased temperature  RUE: Swelling with tenderness at the distal radius. There is a small amount of ecchymosis. She reports sensation that is intact light touch in the radial, ulnar, median nerve distributions. She has a palpable radial pulse. She is nontender at the elbow. Brisk cap refill in the fingers. Imaging:  Prior right wrist radiographs were reviewed and show a minimally displaced intra-articular distal radius fracture. Assessment:  Right distal radius fracture    Plan:  We discussed the diagnosis and treatment options. Given her age, bone quality, and functional status, I recommended nonoperative management in a cast.  She is able to elbow weight-bear on the right upper extremity to be able to use a walker. She will follow-up in 4 weeks for repeat assessment with radiographs out of the cast.    Total time spent on today's encounter was at least 32 minutes.  This time included reviewing prior notes, radiographs, and lab results when available, reviewing history obtained by medical assistant, performing history and physical exam, reviewing tests/radiographs with the patient, counseling the patient, ordering medications or tests, documentation in the electronic health record, and coordination of care. This dictation was done with Dragon dictation and may contain mechanical errors related to translation.

## 2021-08-20 NOTE — PROGRESS NOTES
Latrice Her MD. ordered a short arm cast  to be applied to [facility] right lower arm . I applied a short arm cast cast to [facility] right lower arm. I applied 2 rolls of under padding in a 50/50 fashion. The Yvonne Perea requested blue color fiberglass. I rolled 2 rolls of fiberglass in a 50/50 fashion. Her right lower arm is in a neutral degree position Adali Barkley MD .  [facility]'s nail beds are pink in color, the extremity is warm to the touch. Capillary refill is less than 2 seconds. Yvonne Perea instructed on proper care of cast.  Do not get wet, keep all items out of cast.  If cast is painful please make appointment to get checked. Patient also briefed on circulation compromise. If digits are cold, blue, and tingling patient must must seek care. If after hours patient is to go to Emergency Room. During office hours patient must come in to office.

## 2021-08-23 ENCOUNTER — HOSPITAL ENCOUNTER (INPATIENT)
Age: 86
LOS: 3 days | Discharge: HOSPICE | DRG: 872 | End: 2021-08-26
Attending: STUDENT IN AN ORGANIZED HEALTH CARE EDUCATION/TRAINING PROGRAM | Admitting: STUDENT IN AN ORGANIZED HEALTH CARE EDUCATION/TRAINING PROGRAM
Payer: MEDICARE

## 2021-08-23 ENCOUNTER — APPOINTMENT (OUTPATIENT)
Dept: CT IMAGING | Age: 86
DRG: 872 | End: 2021-08-23
Payer: MEDICARE

## 2021-08-23 ENCOUNTER — APPOINTMENT (OUTPATIENT)
Dept: GENERAL RADIOLOGY | Age: 86
DRG: 872 | End: 2021-08-23
Payer: MEDICARE

## 2021-08-23 DIAGNOSIS — R41.82 ALTERED MENTAL STATUS, UNSPECIFIED ALTERED MENTAL STATUS TYPE: ICD-10-CM

## 2021-08-23 DIAGNOSIS — E86.0 DEHYDRATION: ICD-10-CM

## 2021-08-23 DIAGNOSIS — N39.0 URINARY TRACT INFECTION IN FEMALE: Primary | ICD-10-CM

## 2021-08-23 DIAGNOSIS — Z51.5 HOSPICE CARE: ICD-10-CM

## 2021-08-23 DIAGNOSIS — A41.9 SEPSIS WITHOUT ACUTE ORGAN DYSFUNCTION, DUE TO UNSPECIFIED ORGANISM (HCC): ICD-10-CM

## 2021-08-23 LAB
A/G RATIO: 1.2 (ref 1.1–2.2)
ALBUMIN SERPL-MCNC: 4.1 G/DL (ref 3.4–5)
ALP BLD-CCNC: 61 U/L (ref 40–129)
ALT SERPL-CCNC: 18 U/L (ref 10–40)
AMORPHOUS: ABNORMAL /HPF
ANION GAP SERPL CALCULATED.3IONS-SCNC: 15 MMOL/L (ref 3–16)
AST SERPL-CCNC: 18 U/L (ref 15–37)
BACTERIA: ABNORMAL /HPF
BASOPHILS ABSOLUTE: 0 K/UL (ref 0–0.2)
BASOPHILS RELATIVE PERCENT: 0.1 %
BILIRUB SERPL-MCNC: 0.4 MG/DL (ref 0–1)
BILIRUBIN URINE: NEGATIVE
BLOOD, URINE: ABNORMAL
BUN BLDV-MCNC: 29 MG/DL (ref 7–20)
CALCIUM SERPL-MCNC: 11 MG/DL (ref 8.3–10.6)
CHLORIDE BLD-SCNC: 91 MMOL/L (ref 99–110)
CLARITY: ABNORMAL
CO2: 26 MMOL/L (ref 21–32)
COLOR: YELLOW
COMMENT UA: ABNORMAL
CREAT SERPL-MCNC: 1.1 MG/DL (ref 0.6–1.2)
EOSINOPHILS ABSOLUTE: 0 K/UL (ref 0–0.6)
EOSINOPHILS RELATIVE PERCENT: 0.3 %
EPITHELIAL CELLS, UA: 1 /HPF (ref 0–5)
GFR AFRICAN AMERICAN: 56
GFR NON-AFRICAN AMERICAN: 47
GLOBULIN: 3.5 G/DL
GLUCOSE BLD-MCNC: 173 MG/DL (ref 70–99)
GLUCOSE URINE: NEGATIVE MG/DL
HCT VFR BLD CALC: 39 % (ref 36–48)
HEMOGLOBIN: 13.5 G/DL (ref 12–16)
HYALINE CASTS: 11 /LPF (ref 0–8)
KETONES, URINE: ABNORMAL MG/DL
LACTIC ACID, SEPSIS: 2.7 MMOL/L (ref 0.4–1.9)
LEUKOCYTE ESTERASE, URINE: ABNORMAL
LYMPHOCYTES ABSOLUTE: 1 K/UL (ref 1–5.1)
LYMPHOCYTES RELATIVE PERCENT: 7.4 %
MCH RBC QN AUTO: 31.8 PG (ref 26–34)
MCHC RBC AUTO-ENTMCNC: 34.6 G/DL (ref 31–36)
MCV RBC AUTO: 92 FL (ref 80–100)
MICROSCOPIC EXAMINATION: YES
MONOCYTES ABSOLUTE: 1.4 K/UL (ref 0–1.3)
MONOCYTES RELATIVE PERCENT: 10.1 %
NEUTROPHILS ABSOLUTE: 11.1 K/UL (ref 1.7–7.7)
NEUTROPHILS RELATIVE PERCENT: 82.1 %
NITRITE, URINE: NEGATIVE
PDW BLD-RTO: 11.8 % (ref 12.4–15.4)
PH UA: 7.5 (ref 5–8)
PLATELET # BLD: 350 K/UL (ref 135–450)
PMV BLD AUTO: 7.5 FL (ref 5–10.5)
POTASSIUM REFLEX MAGNESIUM: 4.6 MMOL/L (ref 3.5–5.1)
PRO-BNP: 841 PG/ML (ref 0–449)
PROTEIN UA: NEGATIVE MG/DL
RBC # BLD: 4.24 M/UL (ref 4–5.2)
RBC UA: ABNORMAL /HPF (ref 0–4)
SODIUM BLD-SCNC: 132 MMOL/L (ref 136–145)
SPECIFIC GRAVITY UA: 1.01 (ref 1–1.03)
TOTAL PROTEIN: 7.6 G/DL (ref 6.4–8.2)
URINE REFLEX TO CULTURE: YES
URINE TYPE: ABNORMAL
UROBILINOGEN, URINE: 1 E.U./DL
WBC # BLD: 13.6 K/UL (ref 4–11)
WBC UA: 83 /HPF (ref 0–5)

## 2021-08-23 PROCEDURE — 83605 ASSAY OF LACTIC ACID: CPT

## 2021-08-23 PROCEDURE — 81001 URINALYSIS AUTO W/SCOPE: CPT

## 2021-08-23 PROCEDURE — 99283 EMERGENCY DEPT VISIT LOW MDM: CPT

## 2021-08-23 PROCEDURE — 87086 URINE CULTURE/COLONY COUNT: CPT

## 2021-08-23 PROCEDURE — 93005 ELECTROCARDIOGRAM TRACING: CPT | Performed by: STUDENT IN AN ORGANIZED HEALTH CARE EDUCATION/TRAINING PROGRAM

## 2021-08-23 PROCEDURE — 96360 HYDRATION IV INFUSION INIT: CPT

## 2021-08-23 PROCEDURE — 74177 CT ABD & PELVIS W/CONTRAST: CPT

## 2021-08-23 PROCEDURE — 71045 X-RAY EXAM CHEST 1 VIEW: CPT

## 2021-08-23 PROCEDURE — 83880 ASSAY OF NATRIURETIC PEPTIDE: CPT

## 2021-08-23 PROCEDURE — 96361 HYDRATE IV INFUSION ADD-ON: CPT

## 2021-08-23 PROCEDURE — 1200000000 HC SEMI PRIVATE

## 2021-08-23 PROCEDURE — 6360000004 HC RX CONTRAST MEDICATION: Performed by: PHYSICIAN ASSISTANT

## 2021-08-23 PROCEDURE — 87040 BLOOD CULTURE FOR BACTERIA: CPT

## 2021-08-23 PROCEDURE — 2580000003 HC RX 258: Performed by: NURSE PRACTITIONER

## 2021-08-23 PROCEDURE — 85025 COMPLETE CBC W/AUTO DIFF WBC: CPT

## 2021-08-23 PROCEDURE — 80053 COMPREHEN METABOLIC PANEL: CPT

## 2021-08-23 PROCEDURE — 36415 COLL VENOUS BLD VENIPUNCTURE: CPT

## 2021-08-23 PROCEDURE — 2580000003 HC RX 258: Performed by: PHYSICIAN ASSISTANT

## 2021-08-23 PROCEDURE — 6360000002 HC RX W HCPCS: Performed by: NURSE PRACTITIONER

## 2021-08-23 RX ORDER — FENTANYL CITRATE 50 UG/ML
12.5 INJECTION, SOLUTION INTRAMUSCULAR; INTRAVENOUS ONCE
Status: COMPLETED | OUTPATIENT
Start: 2021-08-23 | End: 2021-08-23

## 2021-08-23 RX ORDER — 0.9 % SODIUM CHLORIDE 0.9 %
30 INTRAVENOUS SOLUTION INTRAVENOUS ONCE
Status: COMPLETED | OUTPATIENT
Start: 2021-08-23 | End: 2021-08-23

## 2021-08-23 RX ADMIN — FENTANYL CITRATE 12.5 MCG: 50 INJECTION, SOLUTION INTRAMUSCULAR; INTRAVENOUS at 23:44

## 2021-08-23 RX ADMIN — SODIUM CHLORIDE 1917 ML: 9 INJECTION, SOLUTION INTRAVENOUS at 21:40

## 2021-08-23 RX ADMIN — IOPAMIDOL 75 ML: 755 INJECTION, SOLUTION INTRAVENOUS at 23:56

## 2021-08-23 RX ADMIN — CEFTRIAXONE 1000 MG: 1 INJECTION, POWDER, FOR SOLUTION INTRAMUSCULAR; INTRAVENOUS at 23:46

## 2021-08-23 ASSESSMENT — PAIN SCALES - GENERAL: PAINLEVEL_OUTOF10: 7

## 2021-08-24 ENCOUNTER — APPOINTMENT (OUTPATIENT)
Dept: GENERAL RADIOLOGY | Age: 86
DRG: 872 | End: 2021-08-24
Payer: MEDICARE

## 2021-08-24 PROBLEM — N39.0 UTI (URINARY TRACT INFECTION): Status: ACTIVE | Noted: 2021-08-24

## 2021-08-24 LAB
ANION GAP SERPL CALCULATED.3IONS-SCNC: 16 MMOL/L (ref 3–16)
BASOPHILS ABSOLUTE: 0 K/UL (ref 0–0.2)
BASOPHILS RELATIVE PERCENT: 0.3 %
BUN BLDV-MCNC: 25 MG/DL (ref 7–20)
CALCIUM SERPL-MCNC: 10.7 MG/DL (ref 8.3–10.6)
CHLORIDE BLD-SCNC: 91 MMOL/L (ref 99–110)
CO2: 26 MMOL/L (ref 21–32)
CREAT SERPL-MCNC: 0.7 MG/DL (ref 0.6–1.2)
EKG ATRIAL RATE: 107 BPM
EKG DIAGNOSIS: NORMAL
EKG P AXIS: 50 DEGREES
EKG P-R INTERVAL: 168 MS
EKG Q-T INTERVAL: 322 MS
EKG QRS DURATION: 84 MS
EKG QTC CALCULATION (BAZETT): 429 MS
EKG R AXIS: -18 DEGREES
EKG T AXIS: 28 DEGREES
EKG VENTRICULAR RATE: 107 BPM
EOSINOPHILS ABSOLUTE: 0.1 K/UL (ref 0–0.6)
EOSINOPHILS RELATIVE PERCENT: 0.4 %
GFR AFRICAN AMERICAN: >60
GFR NON-AFRICAN AMERICAN: >60
GLUCOSE BLD-MCNC: 128 MG/DL (ref 70–99)
HCT VFR BLD CALC: 39 % (ref 36–48)
HEMOGLOBIN: 13.2 G/DL (ref 12–16)
LACTIC ACID, SEPSIS: 1.7 MMOL/L (ref 0.4–1.9)
LIPASE: 21 U/L (ref 13–60)
LYMPHOCYTES ABSOLUTE: 1.2 K/UL (ref 1–5.1)
LYMPHOCYTES RELATIVE PERCENT: 10.1 %
MCH RBC QN AUTO: 31 PG (ref 26–34)
MCHC RBC AUTO-ENTMCNC: 33.9 G/DL (ref 31–36)
MCV RBC AUTO: 91.3 FL (ref 80–100)
MONOCYTES ABSOLUTE: 1.3 K/UL (ref 0–1.3)
MONOCYTES RELATIVE PERCENT: 10.6 %
NEUTROPHILS ABSOLUTE: 9.6 K/UL (ref 1.7–7.7)
NEUTROPHILS RELATIVE PERCENT: 78.6 %
ORGANISM: ABNORMAL
PDW BLD-RTO: 11.8 % (ref 12.4–15.4)
PLATELET # BLD: 353 K/UL (ref 135–450)
PMV BLD AUTO: 8.5 FL (ref 5–10.5)
POTASSIUM REFLEX MAGNESIUM: 4.1 MMOL/L (ref 3.5–5.1)
RBC # BLD: 4.27 M/UL (ref 4–5.2)
SODIUM BLD-SCNC: 133 MMOL/L (ref 136–145)
URINE CULTURE, ROUTINE: ABNORMAL
WBC # BLD: 12.2 K/UL (ref 4–11)

## 2021-08-24 PROCEDURE — 2580000003 HC RX 258: Performed by: NURSE PRACTITIONER

## 2021-08-24 PROCEDURE — 83690 ASSAY OF LIPASE: CPT

## 2021-08-24 PROCEDURE — 85025 COMPLETE CBC W/AUTO DIFF WBC: CPT

## 2021-08-24 PROCEDURE — 1200000000 HC SEMI PRIVATE

## 2021-08-24 PROCEDURE — 93010 ELECTROCARDIOGRAM REPORT: CPT | Performed by: INTERNAL MEDICINE

## 2021-08-24 PROCEDURE — 83605 ASSAY OF LACTIC ACID: CPT

## 2021-08-24 PROCEDURE — 6370000000 HC RX 637 (ALT 250 FOR IP): Performed by: NURSE PRACTITIONER

## 2021-08-24 PROCEDURE — 80048 BASIC METABOLIC PNL TOTAL CA: CPT

## 2021-08-24 PROCEDURE — 73030 X-RAY EXAM OF SHOULDER: CPT

## 2021-08-24 PROCEDURE — 92610 EVALUATE SWALLOWING FUNCTION: CPT

## 2021-08-24 PROCEDURE — 36415 COLL VENOUS BLD VENIPUNCTURE: CPT

## 2021-08-24 PROCEDURE — 87040 BLOOD CULTURE FOR BACTERIA: CPT

## 2021-08-24 PROCEDURE — 6360000002 HC RX W HCPCS: Performed by: NURSE PRACTITIONER

## 2021-08-24 RX ORDER — SODIUM CHLORIDE 0.9 % (FLUSH) 0.9 %
5-40 SYRINGE (ML) INJECTION PRN
Status: DISCONTINUED | OUTPATIENT
Start: 2021-08-24 | End: 2021-08-26 | Stop reason: HOSPADM

## 2021-08-24 RX ORDER — SODIUM CHLORIDE 9 MG/ML
25 INJECTION, SOLUTION INTRAVENOUS PRN
Status: DISCONTINUED | OUTPATIENT
Start: 2021-08-24 | End: 2021-08-26 | Stop reason: HOSPADM

## 2021-08-24 RX ORDER — ACETAMINOPHEN 325 MG/1
650 TABLET ORAL EVERY 6 HOURS PRN
Status: DISCONTINUED | OUTPATIENT
Start: 2021-08-24 | End: 2021-08-26 | Stop reason: HOSPADM

## 2021-08-24 RX ORDER — ACETAMINOPHEN 650 MG/1
650 SUPPOSITORY RECTAL EVERY 6 HOURS PRN
Status: DISCONTINUED | OUTPATIENT
Start: 2021-08-24 | End: 2021-08-26 | Stop reason: HOSPADM

## 2021-08-24 RX ORDER — SODIUM CHLORIDE, SODIUM LACTATE, POTASSIUM CHLORIDE, CALCIUM CHLORIDE 600; 310; 30; 20 MG/100ML; MG/100ML; MG/100ML; MG/100ML
INJECTION, SOLUTION INTRAVENOUS CONTINUOUS
Status: DISCONTINUED | OUTPATIENT
Start: 2021-08-24 | End: 2021-08-26 | Stop reason: HOSPADM

## 2021-08-24 RX ORDER — TRAMADOL HYDROCHLORIDE 50 MG/1
50 TABLET ORAL 2 TIMES DAILY
Status: ON HOLD | COMMUNITY
End: 2021-08-26 | Stop reason: HOSPADM

## 2021-08-24 RX ORDER — SODIUM CHLORIDE 0.9 % (FLUSH) 0.9 %
5-40 SYRINGE (ML) INJECTION EVERY 12 HOURS SCHEDULED
Status: DISCONTINUED | OUTPATIENT
Start: 2021-08-24 | End: 2021-08-26 | Stop reason: HOSPADM

## 2021-08-24 RX ADMIN — SODIUM CHLORIDE, POTASSIUM CHLORIDE, SODIUM LACTATE AND CALCIUM CHLORIDE: 600; 310; 30; 20 INJECTION, SOLUTION INTRAVENOUS at 15:19

## 2021-08-24 RX ADMIN — ENOXAPARIN SODIUM 30 MG: 30 INJECTION SUBCUTANEOUS at 09:27

## 2021-08-24 RX ADMIN — SODIUM CHLORIDE, POTASSIUM CHLORIDE, SODIUM LACTATE AND CALCIUM CHLORIDE: 600; 310; 30; 20 INJECTION, SOLUTION INTRAVENOUS at 02:09

## 2021-08-24 RX ADMIN — ACETAMINOPHEN 650 MG: 325 TABLET ORAL at 15:17

## 2021-08-24 RX ADMIN — Medication 10 ML: at 09:27

## 2021-08-24 RX ADMIN — CEFTRIAXONE 1000 MG: 1 INJECTION, POWDER, FOR SOLUTION INTRAMUSCULAR; INTRAVENOUS at 22:10

## 2021-08-24 ASSESSMENT — PAIN SCALES - WONG BAKER
WONGBAKER_NUMERICALRESPONSE: 0

## 2021-08-24 ASSESSMENT — PAIN SCALES - PAIN ASSESSMENT IN ADVANCED DEMENTIA (PAINAD)
CONSOLABILITY: 0
TOTALSCORE: 0
FACIALEXPRESSION: 0
BODYLANGUAGE: 0
TOTALSCORE: 0
FACIALEXPRESSION: 0
BODYLANGUAGE: 0
BREATHING: 0
NEGVOCALIZATION: 0
CONSOLABILITY: 0
NEGVOCALIZATION: 0
BREATHING: 0

## 2021-08-24 ASSESSMENT — PAIN SCALES - GENERAL
PAINLEVEL_OUTOF10: 0
PAINLEVEL_OUTOF10: 7

## 2021-08-24 NOTE — PROGRESS NOTES
Hospitalist Progress Note      PCP: Raman Butts    Date of Admission: 8/23/2021    Chief Complaint: Altered mental status    Hospital Course: The patient is a 80 y.o. female who presents to [facility] with dementia, acute cholecystitis     Patient resides at [address] [place] [address] Street: DNR CC     Patient was sent in from [facility] this evening for evaluation of some mild altered elevated mental status, variable heart rate, patient complaining of abdominal pain and low back pain, she was also found to have a low oxygen level of roughly 88%.     Recent hospital admission: 7/28 - 7/31: acute cholecystitis (nonoperative)     ED workup: Patient was accompanied by her daughter. She feels that her mother is at baseline. Patient was found to be afebrile. Heart rate 80s to 90s. EKG indicates LVH with trigeminy. Blood pressure stable. No evidence of hypoxia or respiratory distress. WBC is 13.6 with a left shift. UA is 4+ bacteria with a WBC of 83. Lactic acid is 2.7, blood cultures obtained. Patient was given IVF 30 mill per kilo. And admission was requested for UTI     On my exam:; Patient keeps wincing and when asked she points to her right upper quadrant for abdominal pain. Abdomen is a bit Marlen without rebound or guarding. She does have bowel sounds. She had no pain or tenderness on mid lower abdominal palpation or percussion. No evidence of diaphoresis. No evidence of mottling. Daughter expressed to me that the patient has been wincing and she will reach up and hold her right side of her abdomen and she has been belching quite a bit. Subjective:   Patient is confused, shakes her head yes and no and does not verbalize to questions. 2 adult sons at bedside.       Medications:  Reviewed    Infusion Medications    sodium chloride      lactated ringers 100 mL/hr at 08/24/21 1519     Scheduled Medications    sodium chloride flush  5-40 mL IntraVENous 2 times per day    enoxaparin 30 mg Subcutaneous Daily    cefTRIAXone (ROCEPHIN) IV  1,000 mg IntraVENous Q24H     PRN Meds: sodium chloride flush, sodium chloride, acetaminophen **OR** acetaminophen      Intake/Output Summary (Last 24 hours) at 8/24/2021 1548  Last data filed at 8/24/2021 1407  Gross per 24 hour   Intake 659.58 ml   Output 200 ml   Net 459.58 ml       Physical Exam Performed:    /88   Pulse 73   Temp 97.5 °F (36.4 °C) (Oral)   Resp 16   Ht 5' 1\" (1.549 m)   Wt 132 lb 11.5 oz (60.2 kg)   SpO2 96%   BMI 25.08 kg/m²     General appearance: Confused elderly female patient  HEENT: Pupils equal, round, and reactive to light. Conjunctivae/corneas clear. Neck: Supple, with full range of motion. No jugular venous distention. Trachea midline. Respiratory:  Normal respiratory effort. Clear to auscultation, bilaterally without Rales/Wheezes/Rhonchi. Cardiovascular: Regular rate and rhythm with normal S1/S2 without murmurs, rubs or gallops. Abdomen: Soft, non-tender, non-distended with normal bowel sounds. Musculoskeletal: Grimace and tenderness noted on palpation of suprapubic area. skin: Skin color, texture, turgor normal.  No rashes or lesions. Neurologic:  Neurovascularly intact without any focal sensory/motor deficits. Cranial nerves: II-XII intact, grossly non-focal.  Psychiatric: Confused   capillary Refill: Brisk,3 seconds, normal   Peripheral Pulses: +2 palpable, equal bilaterally       Labs:   Recent Labs     08/23/21 2039 08/24/21  0421   WBC 13.6* 12.2*   HGB 13.5 13.2   HCT 39.0 39.0    353     Recent Labs     08/23/21 2039 08/24/21  0421   * 133*   K 4.6 4.1   CL 91* 91*   CO2 26 26   BUN 29* 25*   CREATININE 1.1 0.7   CALCIUM 11.0* 10.7*     Recent Labs     08/23/21 2039   AST 18   ALT 18   BILITOT 0.4   ALKPHOS 61     No results for input(s): INR in the last 72 hours. No results for input(s): Ant Beery in the last 72 hours.     Urinalysis:      Lab Results   Component Value Date NITRU Negative 08/23/2021    WBCUA 83 08/23/2021    BACTERIA 4+ 08/23/2021    RBCUA 3-4 08/23/2021    BLOODU MODERATE 08/23/2021    SPECGRAV 1.015 08/23/2021    GLUCOSEU Negative 08/23/2021       Radiology:  CT ABDOMEN PELVIS W IV CONTRAST Additional Contrast? None   Final Result   1. Stable appearance of bilateral adnexal cystic ovarian masses demonstrating   cystic versus septated cystic appearance with largest loculation on the left   continue to measure up to 7.7 cm in diameter. Continue to suggest follow-up   pelvic contrast enhanced MRI dedicated examination for further evaluation on   outpatient basis. 2. Mild bilateral pleural effusions. 3. Stable appearance of scattered hepatic cysts, with calcification involving   largest cyst near the hepatic dome. 4. Right renal multifocal small simple cysts. 5. Unremarkable appearance of the gallbladder on CT evaluation. Recommend   ultrasound gallbladder examination for further evaluation. 6. Moderate hiatal hernia. 7. Redemonstration of unchanged appearance of T11 and T12 remote vertebral   body compression fractures with T11 vertebral plana and central canal   stenosis. XR CHEST PORTABLE   Final Result   Low lung volumes. Otherwise, unremarkable single semi upright portable AP view of the chest.                 Assessment/Plan:    Active Hospital Problems    Diagnosis     UTI (urinary tract infection) [N39.0]     Sepsis ([address] [A41.9]      Sepsis. Urinary tract infection. Patient was brought to emergency because of altered mental status, was previously admitted with acute cholecystitis, CT abdomen with unremarkable appearance of the gallbladder. Patient has leukocytosis and tachycardia on presentation as well as lactic acidosis. Urine with WBC of 83 and leukocyte esterase. Plan. -Follow urine culture.  -Follow blood culture.  -Continue ceftriaxone. Delirium. Likely in the setting of acute UTI.   We will continue gentle reorientation. DVT Prophylaxis: Lovenox  Diet: ADULT DIET; Dysphagia - Minced and Moist  Code Status: DNR-CC    PT/OT Eval Status:     Dispo - CLAUDIA.      Michelle Swartz MD

## 2021-08-24 NOTE — PROGRESS NOTES
Pharmacy Medication Reconciliation Note     List of medications patient is currently taking is complete. Source of information:   1. Conversation with RN at [facility]    No Known Allergies    Notes regarding home medications:   1.   Added Tramadol BID ( scheduled) and q6h prn breakthrough     Patria Lagos, [address] [place] [address]  8/24/2021  9:17 AM

## 2021-08-24 NOTE — DISCHARGE INSTR - COC
Continuity of Care Form    Patient Name: Isaias Fuentes   :  6/15/1930  MRN:  [id number]    Admit date:  2021  Discharge date:  2021    Code Status Order: DNR-CC   Advance Directives:      Admitting Physician:  Darien Auguste DO  PCP: Arturo Whiting    Discharging Nurse: Pete Reyez. [employer]. [address] Unit/Room#: [id number]-6170/2496-59  Discharging Unit Phone Number: [phone]    Emergency Contact:   Extended Emergency Contact Information  Primary Emergency Contact: Velia Garcia 13 Phone: [phone]  Relation: Child  Secondary Emergency Contact: Lance Deutsch  Address: [address] [place] [address] Phone: [phone]  Work Phone: [phone]  Mobile Phone: [phone]  Relation: Child    Past Surgical History:  History reviewed. No pertinent surgical history. Immunization History: There is no immunization history on file for this patient.     Active Problems:  Patient Active Problem List   Diagnosis Code    Acute cholecystitis K81.0    Sepsis ([address] A41.9    UTI (urinary tract infection) N39.0       Isolation/Infection:   Isolation            No Isolation          Patient Infection Status       None to display            Nurse Assessment:  Last Vital Signs: /88   Pulse 73   Temp 97.5 °F (36.4 °C) (Oral)   Resp 16   Ht 5' 1\" (1.549 m)   Wt 132 lb 11.5 oz (60.2 kg)   SpO2 96%   BMI 25.08 kg/m²     Last documented pain score (0-10 scale): Pain Level: 7  Last Weight:   Wt Readings from Last 1 Encounters:   21 132 lb 11.5 oz (60.2 kg)     Mental Status:  oriented and alert    IV Access:  - None    Nursing Mobility/ADLs:  Walking   {CHP DME EMQV:146282972}  Transfer  {CHP DME YASS:424479492}  Bathing  {CHP DME WVUR:941639011}  Dressing  {CHP DME EWOQ:115197225}  Toileting  {CHP DME AIAV:855951590}  Feeding  {CHP DME LPNN:964133522}  Med Admin  Dependent  Med Delivery   whole    Wound Care Documentation and Therapy: Elimination:  Continence:   · Bowel: No  · Bladder: No  Urinary Catheter: Removal Date 08/26/2021   Colostomy/Ileostomy/Ileal Conduit: No       Date of Last BM:08/23/2021    Intake/Output Summary (Last 24 hours) at 8/24/2021 1623  Last data filed at 8/24/2021 1407  Gross per 24 hour   Intake 659.58 ml   Output 200 ml   Net 459.58 ml     I/O last 3 completed shifts: In: 659.6 [P.O.:420; I.V.:239.6]  Out: 200 [Urine:200]    Safety Concerns:     None    Impairments/Disabilities:      None    Nutrition Therapy:  Current Nutrition Therapy:   - Oral Diet:  General    Routes of Feeding: Oral  Liquids: No Liquids  Daily Fluid Restriction: no  Last Modified Barium Swallow with Video (Video Swallowing Test): not done    Treatments at the Time of Hospital Discharge:   Respiratory Treatments: 2 L  Oxygen Therapy:  is on oxygen at 3 L/min per nasal cannula.   Ventilator:    - No ventilator support    Rehab Therapies: Physical Therapy, Occupational Therapy and Speech/Language Therapy  Weight Bearing Status/Restrictions: No weight bearing restirctions  Other Medical Equipment (for information only, NOT a DME order):  hospital bed  Other Treatments: N/A    Patient's personal belongings (please select all that are sent with patient):  Albert MIRAMONTES SIGNATURE:  {Esignature:[id number]}    CASE MANAGEMENT/SOCIAL WORK SECTION    Inpatient Status Date: ***    Readmission Risk Assessment Score:  Readmission Risk              Risk of Unplanned Readmission:  15           Discharging to Facility/ Agency    Name: [facility]   Address:  [address] [place] [address] Phone:  [phone]   Fax:  [phone]      / signature: Electronically signed by Durga Muñiz RN on 8/24/21 at 4:23 PM EDT    PHYSICIAN SECTION    Prognosis: Poor    Condition at Discharge: Terminal    Rehab Potential (if transferring to Rehab): Poor    Recommended Labs or Other Treatments After Discharge:   - hospice care. Physician Certification: I certify the above information and transfer of Mame Willson  is necessary for the continuing treatment of the diagnosis listed and that she requires Hospice for greater 30 days.      Update Admission H&P: No change in H&P    PHYSICIAN SIGNATURE:  Electronically signed by Mili Ashby MD on 8/26/21 at 11:49 AM EDT

## 2021-08-24 NOTE — CARE COORDINATION
INITIAL CASE MANAGEMENT ASSESSMENT    Reviewed chart, met with patient's son Crissy Phillips to assess possible discharge needs. Explained Case Management role/services. Living Situation: verified pt is from [facility], moving to LTC there from [place]    ADLs: full support from [facility] staff     DME: walker     Transportation: will set up transport at [place]     Medications: per facility    PLAN/COMMENTS: plan is to return back to [facility]    CM provided contact information for patient or family to call with any questions. CM will follow and assist as needed.     Luis Eduardo Esposito RN, BSN,   [phone]    Electronically signed by Luis Eduardo Esposito RN on 8/24/2021 at 9:43 AM

## 2021-08-24 NOTE — PLAN OF CARE
Problem: Pain:  Description: Pain management should include both nonpharmacologic and pharmacologic interventions. Goal: Pain level will decrease  Description: Pain level will decrease  8/24/2021 1756 by Belle Arnold RN  Outcome: Met This Shift     Problem: Pain:  Description: Pain management should include both nonpharmacologic and pharmacologic interventions. Goal: Control of acute pain  Description: Control of acute pain  8/24/2021 1756 by Belle Arnold RN  Outcome: Met This Shift     Problem: Pain:  Description: Pain management should include both nonpharmacologic and pharmacologic interventions.   Goal: Control of chronic pain  Description: Control of chronic pain  8/24/2021 1756 by Belle Arnold RN  Outcome: Met This Shift     Problem: Skin Integrity:  Goal: Will show no infection signs and symptoms  Description: Will show no infection signs and symptoms  8/24/2021 1756 by Belle Arnold RN  Outcome: Met This Shift     Problem: Skin Integrity:  Goal: Absence of new skin breakdown  Description: Absence of new skin breakdown  8/24/2021 1756 by Belle Arnold RN  Outcome: Met This Shift     Problem: Falls - Risk of:  Goal: Will remain free from falls  Description: Will remain free from falls  8/24/2021 1756 by Belle Arnold RN  Outcome: Met This Shift    Problem: Falls - Risk of:  Goal: Absence of physical injury  Description: Absence of physical injury  8/24/2021 1756 by Belle Arnold RN  Outcome: Met This Shift

## 2021-08-24 NOTE — PROGRESS NOTES
0848Pt alert drowsy, not vocalizing communication providing nonverbal responses. Son Reno Mannie at bedside stating \"This not not her normal she will normally talk and does have dementia but is not confused during the day\". RN made Dr. Vane Sanders aware. Patient is resting with bed in lowest position, call light in reach, and bed alarm engaged. RN will continue to monitor the patient. @4425 Family at bedside expressed concerns about pt recent injuries related to a fall prior to hospilization and right upper and lower abdominal pain . RN informed Dr. Vane Sanders, see new orders.

## 2021-08-24 NOTE — CARE COORDINATION
08/24/21 0950   Readmission Assessment   Number of Days since last admission? 8-30 days   Previous Disposition SNF   Who is being Home Rajput  (son Lewis Ocampo)   What was the patient's/caregiver's perception as to why they think they needed to return back to the hospital? Other (Comment)  (she may have pancreatitis again)   Did you visit your Primary Care Physician after you left the hospital, before you returned this time? Yes   Did you see a specialist, such as Cardiac, Pulmonary, Orthopedic Physician, etc. after you left the hospital? No   Who advised the patient to return to the hospital? Skilled Unit   Does the patient report anything that got in the way of taking their medications?  No     Electronically signed by Durga Muñiz RN on 8/24/21 at 9:51 AM EDT

## 2021-08-24 NOTE — H&P
to the HPI. Past Medical History:        Diagnosis Date    Dementia [facility])        Past Surgical History:    History reviewed. No pertinent surgical history. Medications Prior to Admission:    Prior to Admission medications    Medication Sig Start Date End Date Taking? Authorizing Provider   docusate sodium (COLACE, DULCOLAX) 100 MG CAPS Take 100 mg by mouth 2 times daily 7/31/21   aJnes Chacon MD   alendronate (FOSAMAX) 70 MG tablet Take 70 mg by mouth every 7 days on wednesday    Historical Provider, MD   calcium carbonate-vitamin D (CALCIUM 600 + D) 600-400 MG-UNIT TABS per tab Take 1 tablet by mouth daily    Historical Provider, MD   cetirizine (ZYRTEC) 10 MG tablet Take 10 mg by mouth daily    Historical Provider, MD   Multiple Vitamin (MULTIVITAMIN ADULT) TABS Take 1 tablet by mouth once daily    Historical Provider, MD   acetaminophen (TYLENOL) 500 MG tablet Take 1,000 mg by mouth every 8 hours as needed for Pain    Historical Provider, MD   dimenhyDRINATE (DRAMAMINE) 50 MG tablet Take 50 mg by mouth once Daily as needed for motion sickness    Historical Provider, MD   fluticasone (VERAMYST) 27.5 MCG/SPRAY nasal spray 1 spray by Each Nostril route daily    Historical Provider, MD       Allergies:  Patient has no known allergies. Social History:  The patient currently lives [facility]. TOBACCO:   reports that she has never smoked. She has never used smokeless tobacco.  ETOH:   reports no history of alcohol use. Family History:  Reviewed in detail and negative for DM, Early CAD, Cancer, CVA. Positive as follows:    History reviewed. No pertinent family history. REVIEW OF SYSTEMS:    and as noted in the HPI. All other systems reviewed and negative.     PHYSICAL EXAM:    BP (!) 181/99   Pulse 59   Temp 98.4 °F (36.9 °C) (Oral)   Resp 18   Ht 5' 1\" (1.549 m)   Wt 140 lb 14 oz (63.9 kg)   SpO2 92%   BMI 26.62 kg/m²     General appearance: No apparent distress appears stated age and cooperative. HEENT Normal cephalic, atraumatic without obvious deformity. Pupils equal, round, and reactive to light. Extra ocular muscles intact. Conjunctivae/corneas clear. Neck: Supple, No jugular venous distention/bruits. Trachea midline without thyromegaly or adenopathy with full range of motion. Lungs: Clear to auscultation, bilaterally without Rales/Wheezes/Rhonchi with good respiratory effort. Heart: Regular rate and rhythm with Normal S1/S2 without murmurs, rubs or gallops, point of maximum impulse non-displaced  Abdomen: Soft, ? Tenderness RUQ. No palpable masses. Extremities: No clubbing, cyanosis, or edema bilaterally. Full range of motion without deformity and normal gait intact. Skin: Skin color, texture, turgor normal.  No rashes or lesions. Neurologic: Alert and oriented X 3, neurovascularly intact with sensory/motor intact upper extremities/lower extremities, bilaterally. Cranial nerves: II-XII intact, grossly non-focal.  Mental status: Alert, oriented, thought content appropriate. Capillary Refill: Acceptable  < 3 seconds  Peripheral Pulses: +3 Easily felt, not easily obliterated with pressure      CXR:  I have reviewed the CXR with the following interpretation: No acute disease  EKG:  I have reviewed the EKG with the following interpretation: Trigeminy, LVH rate 107, PA interval 168, QRS 8 4,     CBC   Recent Labs     08/23/21 2039   WBC 13.6*   HGB 13.5   HCT 39.0         RENAL  Recent Labs     08/23/21 2039   *   K 4.6   CL 91*   CO2 26   BUN 29*   CREATININE 1.1     LFT'S  Recent Labs     08/23/21 2039   AST 18   ALT 18   BILITOT 0.4   ALKPHOS 61     COAG  No results for input(s): INR in the last 72 hours. CARDIAC ENZYMES  No results for input(s): CKTOTAL, CKMB, CKMBINDEX, TROPONINI in the last 72 hours.     U/A:    Lab Results   Component Value Date    COLORU YELLOW 08/23/2021    WBCUA 83 08/23/2021    RBCUA 3-4 08/23/2021    BACTERIA 4+ 08/23/2021 CLARITYU CLOUDY 08/23/2021    SPECGRAV 1.015 08/23/2021    LEUKOCYTESUR LARGE 08/23/2021    BLOODU MODERATE 08/23/2021    GLUCOSEU Negative 08/23/2021    AMORPHOUS 2+ 08/23/2021       ABG  No results found for: AIE7WDV, BEART, Y3OKRACV, PHART, THGBART, JZT3FXN, PO2ART, DMQ7YTZ        Active Hospital Problems    Diagnosis Date Noted    UTI (urinary tract infection) [N39.0] 08/24/2021    Sepsis ([address] [A41.9] 08/23/2021         PHYSICIANS CERTIFICATION:    I certify that Nasra Langston is expected to be hospitalized for less than 2 midnights based on the following assessment and plan:      ASSESSMENT/PLAN:    Sepsis (POA): 2/2-> UTI  CT of the abdomen and pelvis is also pending as recurrent \"acute cholecystitis\" could be a  contributing factor  No evidence of fever, hypotension. Mild tachycardia with a trigeminal presentation, WBC 13.6 with left shift 11.1, lactic acid 2.7  Urine 4+ bacteria with WBC 83  Patient received IVF 30 mill per kilo in ED  Continuous IVF  Strict I & O  Blood cultures x2  Trend lactic acid 2.7=>  Rocephin for UTI, may need to escalate antibiotic therapy if there is a reoccurrence of acute cholecystitis    UTI: UA 4+ bacteria, microscopic WBC 83  Rocephin  Urine culture in process      DVT Prophylaxis: Lovenox  Diet: ADULT DIET; Easy to Chew  Code Status: DNR-CC  PT/OT Eval Status:     Dispo -admit, inpatient       Shauna Arriola, APRN - CNP    Thank you Jazmin Camp for the opportunity to be involved in this patient's care. If you have any questions or concerns please feel free to contact me at [phone].

## 2021-08-24 NOTE — PROGRESS NOTES
Patient transferred from ED. Patient disoriented x4. VSS. Call light in reach. Bed locked in lowest position.  Will continue to monitor

## 2021-08-24 NOTE — ED PROVIDER NOTES
[address]       Pt Name: Claribel Olivares  MRN: [id number]  [place] 6/15/1930  Date of evaluation: 8/23/2021  Provider: FELIPE Bonilla    The ED Attending Physician was available for consultation but did not see or evaluate this patient. CHIEF COMPLAINT       Chief Complaint   Patient presents with    Altered Mental Status     Pt sent in from [facility] for increased confusionsnd generalized weakness. HISTORY OF PRESENT ILLNESS  (Location/Symptom, Timing/Onset, Context/Setting, Quality, Duration, Modifying Factors, Severity.)   Claribel Olivares is a 80 y.o. female who presents to the emergency department with complaint of altered mental status and reported hypoxia. Patient comes from a nursing home, has some baseline dementia, but nursing home reports that she was confused and had an oxygen saturation of 88% on room air. She does not regularly use supplemental oxygen, no chronic lung disease. Patient's son at bedside says the patient does seem quite a bit more confused than usual.  She was recently seen in the hospital for a broken wrist suffered while trying to get out of bed, and she has a cast on today. Patient is alert and denies pain, but not able to contribute to this HPI. Nursing Notes were reviewed and I agree. REVIEW OF SYSTEMS    (2-9 systems for level 4, 10 or more for level 5)     ROS unavailable due to patient's mental status. PAST MEDICAL HISTORY         Diagnosis Date    Dementia [facility])        SURGICAL HISTORY     History reviewed. No pertinent surgical history.     CURRENT MEDICATIONS       Previous Medications    ACETAMINOPHEN (TYLENOL) 500 MG TABLET    Take 1,000 mg by mouth every 8 hours as needed for Pain    ALENDRONATE (FOSAMAX) 70 MG TABLET    Take 70 mg by mouth every 7 days on wednesday    CALCIUM CARBONATE-VITAMIN D (CALCIUM 600 + D) 600-400 MG-UNIT TABS PER TAB    Take 1 tablet by mouth daily    CETIRIZINE RDW 11.8 (*)     Neutrophils Absolute 11.1 (*)     Monocytes Absolute 1.4 (*)     All other components within normal limits    Narrative:     Performed at:  [facility]  [address], [facility] [place]   Phone [phone]   COMPREHENSIVE METABOLIC PANEL W/ REFLEX TO MG FOR LOW K - Abnormal; Notable for the following components:    Sodium 132 (*)     Chloride 91 (*)     Glucose 173 (*)     BUN 29 (*)     GFR Non- 47 (*)     GFR  56 (*)     Calcium 11.0 (*)     All other components within normal limits    Narrative:     Performed at:  [place] [address], [facility] [address]   Phone [phone]   URINE RT REFLEX TO CULTURE - Abnormal; Notable for the following components:    Clarity, UA CLOUDY (*)     Ketones, Urine TRACE (*)     Blood, Urine MODERATE (*)     Leukocyte Esterase, Urine LARGE (*)     All other components within normal limits    Narrative:     Performed at:  [facility]  [address], [facility] [address]   Phone [phone]   LACTATE, SEPSIS - Abnormal; Notable for the following components:    Lactic Acid, Sepsis 2.7 (*)     All other components within normal limits    Narrative:     Performed at:  [address] [facility] [address]   Phone [phone]   BRAIN NATRIURETIC PEPTIDE - Abnormal; Notable for the following components:    Pro- (*)     All other components within normal limits    Narrative:     Performed at:  [facility]  [address], [facility] [address]   Phone [phone]   MICROSCOPIC URINALYSIS - Abnormal; Notable for the following components:    Bacteria, UA 4+ (*)     Hyaline Casts, UA 11 (*)     WBC, UA 83 (*)     All other components within normal limits    Narrative:     Performed at:  [facility]  [address]  [facility] [address] [address]   Phone [phone]   CULTURE, BLOOD 1   CULTURE, BLOOD 2   CULTURE, URINE   LACTATE, SEPSIS       All other labs were within normal range or not returned as of this dictation. EMERGENCY DEPARTMENT COURSE and DIFFERENTIAL DIAGNOSIS/MDM:   Vitals:    Vitals:    08/23/21 2013 08/23/21 2230 08/23/21 2246   BP: (!) 157/78 (!) 154/59 (!) 172/59   Pulse: 105 96 97   Resp: 20 14 14   Temp: 97.9 °F (36.6 °C)     TempSrc: Oral     SpO2: 97% 97% 97%   Weight: 140 lb 14 oz (63.9 kg)     Height: 5' 1\" (1.549 m)         The patient's condition in the ED was stable. She presented slightly tachycardic at 105, respiratory rate of 20, but oxygenating well on room air here. Not febrile. Lab work-up revealed a UTI, with elevations in serum WBC, lactate, and BUN/CR ratio. Creatinine was normal, however. Patient was given IV fluids and antibiotics in the emergency department. She is septic and in need of hospital admission. I consulted the hospitalist, who agreed to accept and admit the patient. PROCEDURES:  None    FINAL IMPRESSION      1. Urinary tract infection in female    2. Sepsis without acute organ dysfunction, due to unspecified organism ([address]    3. Altered mental status, unspecified altered mental status type    4. Dehydration          DISPOSITION/PLAN   DISPOSITION Decision To Admit 08/23/2021 11:01:11 PM      PATIENT REFERRED TO:  No follow-up provider specified.     DISCHARGE MEDICATIONS:  New Prescriptions    No medications on file       (Please note that portions of this note were completed with a voice recognition program.  Efforts were made to edit the dictations but occasionally words are mis-transcribed.)    Niurka Castro, [address] [place] [address]  08/23/21 5157

## 2021-08-24 NOTE — PROGRESS NOTES
Speech Language Pathology  Facility/Department: [address] MED SURG   CLINICAL BEDSIDE SWALLOW EVALUATION    NAME: Yamilet Zelaya  : 6/15/1930  MRN: [id number]    ADMISSION DATE: 2021  ADMITTING DIAGNOSIS: Dehydration [E86.0]  Sepsis ([address] [A41.9]  Altered mental status, unspecified altered mental status type [R41.82]  Urinary tract infection in female [N39.0]  Sepsis without acute organ dysfunction, due to unspecified organism [facility]) [A399]    80year old has Acute cholecystitis; Sepsis ([address] and UTI (urinary tract infection) on their problem list.    ONSET DATE: 2021    CHART REVIEW: Admitted with AMS, variable heart rate and abdominal/low back pain  H&P Patient was sent in from [facility] this evening for evaluation of some mild altered elevated mental status, variable heart rate, patient complaining of abdominal pain and low back pain, she was also found to have a low oxygen level of roughly 88%.     Recent hospital admission:  - : acute cholecystitis (nonoperative)     ED workup: Patient was accompanied by her daughter. She feels that her mother is at baseline. Patient was found to be afebrile. Heart rate 80s to 90s. EKG indicates LVH with trigeminy. Blood pressure stable. No evidence of hypoxia or respiratory distress. WBC is 13.6 with a left shift. UA is 4+ bacteria with a WBC of 83. Lactic acid is 2.7, blood cultures obtained. Patient was given IVF 30 mill per kilo. And admission was requested for UTI     On my exam:; Patient keeps wincing and when asked she points to her right upper quadrant for abdominal pain. Abdomen is a bit Marlen without rebound or guarding. She does have bowel sounds. She had no pain or tenderness on mid lower abdominal palpation or percussion. No evidence of diaphoresis. No evidence of mottling.   Daughter expressed to me that the patient has been wincing and she will reach up and hold her right side of her abdomen and she has been belching quite a bit.  Patient herself is fairly nonverbal occasionally will speak to yes or no questions. She is not able to contribute much to the HPI. CHEST X-RAY 8/23/21:  Impression   Low lung volumes.       Otherwise, unremarkable single semi upright portable AP view of the chest.         CT ABDOMEN/PELVIS 8/23/21:  Impression   1. Stable appearance of bilateral adnexal cystic ovarian masses demonstrating   cystic versus septated cystic appearance with largest loculation on the left   continue to measure up to 7.7 cm in diameter.  Continue to suggest follow-up   pelvic contrast enhanced MRI dedicated examination for further evaluation on   outpatient basis. 2. Mild bilateral pleural effusions. 3. Stable appearance of scattered hepatic cysts, with calcification involving   largest cyst near the hepatic dome. 4. Right renal multifocal small simple cysts. 5. Unremarkable appearance of the gallbladder on CT evaluation.  Recommend   ultrasound gallbladder examination for further evaluation. 6. Moderate hiatal hernia. 7. Redemonstration of unchanged appearance of T11 and T12 remote vertebral   body compression fractures with T11 vertebral plana and central canal   stenosis.         Date of Eval: 8/24/2021  Evaluating Therapist: ERINN Ralph    Current Diet level:  Current Diet :  (Easy to chew)  Current Liquid Diet : Thin      Primary Complaint  Patient Complaint: Family reports pt had difficulty swallowing mac n' cheese at lunch and was unable to swallow    Pain:  Pain Assessment  Pain Assessment: Advanced Dementia  Pain Level: 7    Reason for Referral  Alexys Hinds was referred for a bedside swallow evaluation to assess the efficiency of her swallow function, identify signs and symptoms of aspiration and make recommendations regarding safe dietary consistencies, effective compensatory strategies, and safe eating environment.     Impression  Dysphagia Diagnosis: Moderate to severe oral stage dysphagia;Mild pharyngeal stage dysphagia  Dysphagia Impression :   · Patient accepted and tolerated evaluation at bedside. · Patient partially reclined in bed due to pain in back and unable to tolerate sitting more than 30 degrees upright. · Pt non-verbal, but able to nod/shake head for yes/no. Able to follow simple dx with mod cueing. Pt oriented x1. Family reports pt with decreased cognition and normally oriented x4 and able to recall times of activities in ECF. · Patient with moderate-severe oral dysphagia. Patient unable to masticate soft and bite-sized solid with continuous mastication and formation of bolus, with no AP transit of solid. Patient with slow prolonged disorganized formation of bolus with puree and minced and moist solid, however able to swallow and clear bolus. Pt noted with some lingual residue with puree and minced and moist cleared with cue for multiple swallow and liquid wash. · Patient presents with mild pharyngeal dysphagia characterized by decreased laryngeal elevation and delayed swallow initiation. · No overt s/s of aspiration or penetration with all solids and liquids. · ST to recommend downgrade to minced and moist and thin liquids. ·  ST to follow 3-5x/week during acute admission. ·   Dysphagia Outcome Severity Scale: Level 4: Mild moderate dysphagia- Intermittent supervision/cueing. One - two diet consistencies restricted     Treatment Plan  Requires SLP Intervention: Yes  Duration/Frequency of Treatment: 3-5x/week during acute admission          Recommended Diet and Intervention  Diet Solids Recommendation: Dysphagia Minced and Moist (Dysphagia II)  Liquid Consistency Recommendation: Thin  Recommended Form of Meds: PO  Recommendations: Dysphagia treatment; Therapeutic feeds with SLP only  Therapeutic Interventions: Therapeutic PO trials with SLP; Diet tolerance monitoring;Patient/Family education    Compensatory Swallowing Strategies  Compensatory Swallowing Strategies: Upright as possible for all oral intake;Remain upright for 30-45 minutes after meals;Eat/Feed slowly; Small bites/sips    Treatment/Goals  Dysphagia Goals: The patient will tolerate recommended diet without observed clinical signs of aspiration; The patient will tolerate mechanical soft foods 10/10. ;The patient will tolerate thin liquids without signs and symptoms of aspiration 10/10 via straw. ;The patient/caregiver will demonstrate understanding of compensatory strategies for improved swallowing safety. General  Chart Reviewed: Yes  Subjective  Subjective: Patient alert and responsive  Behavior/Cognition: Alert; Cooperative  Communication Observation: Aphasia; Non-verbal (Able to answer yes/no questions, non-verbal during assessment)  Follows Directions: Simple (Mod cueing required)  Dentition: Adequate  Patient Positioning: Partially reclined (Pt unable to tolerate sitting upright due to pain)  Prior Dysphagia History: None documented, patient reports pt was on a regular texture diet and thin liquids  Consistencies Administered: Dysphagia Soft and Bite-Sized (Dysphagia III); Dysphagia Minced and Moist (Dysphagia II); Dysphagia Pureed (Dysphagia I); Nectar - straw; Thin - straw           Vision/Hearing  Vision  Vision: Within Functional Limits (Adequate for assessment needs)  Hearing  Hearing: Within functional limits (Adequate for assessment needs)    Oral Motor Deficits  Oral/Motor  Oral Motor: Within functional limits    Oral Phase Dysfunction  Oral Phase  Oral Phase: Exceptions  Oral Phase Dysfunction  Impaired Mastication: Soft Solid  Decreased Anterior to Posterior Transit: All  Suspected Premature Bolus Loss: All  Lingual/Palatal Residue: All  Oral Phase  Oral Phase - Comment: Patient with moderate-severe oral dysphagia. Patient unable to masticate soft and bite-sized solid with continuous mastication and formation of bolus, with no AP transit of solid.  Patient with slow prolonged disorganized formation of bolus with puree and minced and moist solid, however able to swallow and clear bolus. Pt noted with some lingual residue with puree and minced and moist cleared with cue for multiple swallow and liquid wash. Indicators of Pharyngeal Phase Dysfunction   Pharyngeal Phase  Pharyngeal Phase: Exceptions  Indicators of Pharyngeal Phase Dysfunction  Delayed Swallow: All  Decreased Laryngeal Elevation: All  Pharyngeal Phase   Pharyngeal: Patient presents with mild pharyngeal dysphagia characterized by decreased laryngeal elevation and delayed swallow initiation. No overt s/s of aspiration or penetration with all solids and liquids.     Prognosis  Prognosis  Prognosis for safe diet advancement: fair  Barriers to reach goals: cognitive deficits  Individuals consulted  Consulted and agree with results and recommendations: Patient;RN;Family member  Family member consulted: Patient's children    Education  Patient Education: Patient educated on purpose of ST services and diet recommendations  Patient Education Response: No evidence of learning             Therapy Time  SLP Individual Minutes  Time In: 4541  Time Out: 2952  Minutes: [address], [address] [place] [address]  Speech-Language Pathologist  On 08/24/21 at 2:45 PM

## 2021-08-24 NOTE — CARE COORDINATION
Verified with son that pt is a DNR-CC.     Electronically signed by Gisela Doshi RN on 8/24/21 at 9:49 AM EDT

## 2021-08-24 NOTE — PROGRESS NOTES
4 Eyes Skin Assessment     NAME:  José Luis Lang  YOB: 1930  MEDICAL RECORD NUMBER:  [id number]    The patient is being assess for  Admission    I agree that 2 RN's have performed a thorough Head to Toe Skin Assessment on the patient. ALL assessment sites listed below have been assessed. Areas assessed by both nurses:    Head, Face, Ears, Shoulders, Back, Chest, Arms, Elbows, Hands, Sacrum. Buttock, Coccyx, Ischium and Legs. Feet and Heels        Does the Patient have a Wound?  No noted wound(s)       Norman Prevention initiated:  No   Wound Care Orders initiated:  NA    Pressure Injury (Stage 3,4, Unstageable, DTI, NWPT, and Complex wounds) if present place consult order under [de-identified] No    New and Established Ostomies if present place consult order under : NA      Nurse 1 eSignature: Electronically signed by Teresa Jay RN on 8/24/21 at 2:13 AM EDT    **SHARE this note so that the co-signing nurse is able to place an eSignature**    Nurse 2 eSignature: Electronically signed by Cecil Sesay RN on 8/26/21 at 4:45 PM EDT

## 2021-08-25 LAB
ANION GAP SERPL CALCULATED.3IONS-SCNC: 14 MMOL/L (ref 3–16)
BASOPHILS ABSOLUTE: 0 K/UL (ref 0–0.2)
BASOPHILS RELATIVE PERCENT: 0.3 %
BUN BLDV-MCNC: 13 MG/DL (ref 7–20)
CALCIUM SERPL-MCNC: 9.7 MG/DL (ref 8.3–10.6)
CHLORIDE BLD-SCNC: 96 MMOL/L (ref 99–110)
CO2: 25 MMOL/L (ref 21–32)
CREAT SERPL-MCNC: 0.5 MG/DL (ref 0.6–1.2)
EOSINOPHILS ABSOLUTE: 0.1 K/UL (ref 0–0.6)
EOSINOPHILS RELATIVE PERCENT: 0.8 %
GFR AFRICAN AMERICAN: >60
GFR NON-AFRICAN AMERICAN: >60
GLUCOSE BLD-MCNC: 117 MG/DL (ref 70–99)
HCT VFR BLD CALC: 37.8 % (ref 36–48)
HEMOGLOBIN: 12.8 G/DL (ref 12–16)
LYMPHOCYTES ABSOLUTE: 1.3 K/UL (ref 1–5.1)
LYMPHOCYTES RELATIVE PERCENT: 12.2 %
MCH RBC QN AUTO: 31.3 PG (ref 26–34)
MCHC RBC AUTO-ENTMCNC: 33.9 G/DL (ref 31–36)
MCV RBC AUTO: 92.3 FL (ref 80–100)
MONOCYTES ABSOLUTE: 1.1 K/UL (ref 0–1.3)
MONOCYTES RELATIVE PERCENT: 10.2 %
NEUTROPHILS ABSOLUTE: 8 K/UL (ref 1.7–7.7)
NEUTROPHILS RELATIVE PERCENT: 76.5 %
PDW BLD-RTO: 11.9 % (ref 12.4–15.4)
PLATELET # BLD: 313 K/UL (ref 135–450)
PMV BLD AUTO: 7.7 FL (ref 5–10.5)
POTASSIUM REFLEX MAGNESIUM: 3.9 MMOL/L (ref 3.5–5.1)
RBC # BLD: 4.1 M/UL (ref 4–5.2)
SODIUM BLD-SCNC: 135 MMOL/L (ref 136–145)
WBC # BLD: 10.5 K/UL (ref 4–11)

## 2021-08-25 PROCEDURE — 2580000003 HC RX 258: Performed by: NURSE PRACTITIONER

## 2021-08-25 PROCEDURE — 6360000002 HC RX W HCPCS: Performed by: STUDENT IN AN ORGANIZED HEALTH CARE EDUCATION/TRAINING PROGRAM

## 2021-08-25 PROCEDURE — 36415 COLL VENOUS BLD VENIPUNCTURE: CPT

## 2021-08-25 PROCEDURE — 85025 COMPLETE CBC W/AUTO DIFF WBC: CPT

## 2021-08-25 PROCEDURE — 80048 BASIC METABOLIC PNL TOTAL CA: CPT

## 2021-08-25 PROCEDURE — 1200000000 HC SEMI PRIVATE

## 2021-08-25 PROCEDURE — 6360000002 HC RX W HCPCS: Performed by: NURSE PRACTITIONER

## 2021-08-25 RX ADMIN — CEFTRIAXONE 1000 MG: 1 INJECTION, POWDER, FOR SOLUTION INTRAMUSCULAR; INTRAVENOUS at 20:44

## 2021-08-25 RX ADMIN — SODIUM CHLORIDE, POTASSIUM CHLORIDE, SODIUM LACTATE AND CALCIUM CHLORIDE: 600; 310; 30; 20 INJECTION, SOLUTION INTRAVENOUS at 03:47

## 2021-08-25 RX ADMIN — Medication 10 ML: at 09:44

## 2021-08-25 RX ADMIN — HYDROMORPHONE HYDROCHLORIDE 0.5 MG: 1 INJECTION, SOLUTION INTRAMUSCULAR; INTRAVENOUS; SUBCUTANEOUS at 18:18

## 2021-08-25 RX ADMIN — ENOXAPARIN SODIUM 30 MG: 30 INJECTION SUBCUTANEOUS at 09:44

## 2021-08-25 RX ADMIN — Medication 10 ML: at 20:44

## 2021-08-25 ASSESSMENT — PAIN SCALES - PAIN ASSESSMENT IN ADVANCED DEMENTIA (PAINAD)
CONSOLABILITY: 0
TOTALSCORE: 0
NEGVOCALIZATION: 0
FACIALEXPRESSION: 0
BREATHING: 0
TOTALSCORE: 0
BODYLANGUAGE: 0
BODYLANGUAGE: 0
FACIALEXPRESSION: 0
NEGVOCALIZATION: 0
FACIALEXPRESSION: 0
BODYLANGUAGE: 0
FACIALEXPRESSION: 0
NEGVOCALIZATION: 0
BODYLANGUAGE: 0
CONSOLABILITY: 0
BREATHING: 0
CONSOLABILITY: 0
BREATHING: 0
TOTALSCORE: 0
CONSOLABILITY: 0
BREATHING: 0
CONSOLABILITY: 0
TOTALSCORE: 0
BODYLANGUAGE: 0
NEGVOCALIZATION: 0
CONSOLABILITY: 0
TOTALSCORE: 0
FACIALEXPRESSION: 0
CONSOLABILITY: 0
BODYLANGUAGE: 0
NEGVOCALIZATION: 0
FACIALEXPRESSION: 0
CONSOLABILITY: 0
TOTALSCORE: 0
BREATHING: 0
NEGVOCALIZATION: 0
TOTALSCORE: 0
TOTALSCORE: 0
NEGVOCALIZATION: 0
FACIALEXPRESSION: 0
TOTALSCORE: 0
FACIALEXPRESSION: 0
BREATHING: 0
BREATHING: 0
NEGVOCALIZATION: 0
NEGVOCALIZATION: 0
CONSOLABILITY: 0
TOTALSCORE: 0
BREATHING: 0
FACIALEXPRESSION: 0
CONSOLABILITY: 0
BODYLANGUAGE: 0
BODYLANGUAGE: 0
FACIALEXPRESSION: 0
BODYLANGUAGE: 0
BODYLANGUAGE: 0
BREATHING: 0
BREATHING: 0
NEGVOCALIZATION: 0

## 2021-08-25 ASSESSMENT — PAIN SCALES - GENERAL
PAINLEVEL_OUTOF10: 7
PAINLEVEL_OUTOF10: 0

## 2021-08-25 ASSESSMENT — PAIN SCALES - WONG BAKER
WONGBAKER_NUMERICALRESPONSE: 0

## 2021-08-25 ASSESSMENT — PAIN DESCRIPTION - LOCATION: LOCATION: ARM;BACK

## 2021-08-25 ASSESSMENT — PAIN DESCRIPTION - PAIN TYPE: TYPE: ACUTE PAIN;CHRONIC PAIN

## 2021-08-25 NOTE — CARE COORDINATION
Pt will need covid test some time during her hospital stay prior to returning to [facility]; order placed today.     Electronically signed by Durga Muñiz RN on 8/25/21 at 12:04 PM EDT

## 2021-08-25 NOTE — PROGRESS NOTES
Speech Language Pathology    Dysphagia tx/follow-up attempted. Patient declining PO trials at this time; family at bedside reports consideration for hospice care. Family requests ST to re-attempt follow-up at a later date pending goals of care. Thank you. Phyllis Woodward, [address] [id number]  Speech-Language Pathologist  Portable phone: [phone]

## 2021-08-25 NOTE — CONSULTS
PALLIATIVE MEDICINE CONSULTATION     Patient Huy Arellano   TXM:[id number]    :6/15/1930  Room/Bed:R9T-6902/4265-01   LOS: 2 days         Date of consult:2021    Consult Information  Palliative Medicine Consult performed by: EVON Arias CNP  CNP  Requested by: Dr Nurys Dent  Reason for consult: [address]    Number of admissions past 12 months: 1    ASSESSMENT/RECOMMENDATIONS     80 y.o. female with Hypoxia and AMS      Symptom Management:  1. AMS- pt has dementia at baseline, increased confusion with acute infection  2. Hypoxia- Pt resp status improving since admission  3. Abdominal pain- improved on exam no pain with moderate palpation. Abdominal US canceled per family request following conversation with Dr Nurys Dent   4. Goals of Care- Talked to 2 of pts 6 jodi Kumar and Heather Sierra at bedside they want comfort care for their mother and to stop coming to the hospital. They asked for information on Hospice care. Pt is DNRCC at baseline. We had a long discussion about [address] and Hospice referral ordered    Patient/Family Goals of Care :    Talked to 2 of pts 6 jodi Larsener and Heather Sierra at bedside they want comfort care for their mother and to stop coming to the hospital. They asked for information on Hospice care. Pt is DNRCC at baseline. We had a long discussion about [address] and Hospice referral ordered    Disposition/Discharge Plan:   pending    Advance Directives:  · Surrogate Decision Maker: Heather Harman  · Code status:  DNR-CC    Case discussed with: patient, floor RN, Dr Martin Bella  Thank you for allowing us to participate in the care of this patient. HISTORY     CC: AMS  HPI: The patient is a 80 y.o. female with presents to the emergency department with complaint of altered mental status and reported hypoxia. Patient comes from a nursing home, has some baseline dementia, but nursing home reports that she was confused and had an oxygen saturation of 88% on room air.     Palliative Medicine SymptomScreening/ROS:  Review of Systems - History obtained from child, chart review and unobtainable from patient due to mental status    Patient unable to complete full ROS due to current cognitive status. Information that is obtained from nursing and chart.      Pain:    Home med list and hospital medications reviewed in chart as of 8/25/2021     EXAM     Vitals:    08/25/21 0556   BP: (!) 176/73   Pulse: 59   Resp: 20   Temp: 98 °F (36.7 °C)   SpO2: 94%       Physical Examination: General appearance - chronically ill appearing  Mental status - confused, drowsy  Neck - supple, no significant adenopathy  Chest - no tachypnea, retractions or cyanosis  Abdomen - soft, nontender, nondistended, no masses or organomegaly  Musculoskeletal - no joint tenderness, deformity or swelling  Skin - normal coloration and turgor, no rashes, no suspicious skin lesions noted       Current labs in the epic chart reviewed as of 8/25/2021   Review of previous notes, admits, labs, radiology and testing relevant to this consult done in this chart today 8/25/2021    Signed By: Electronically signed by EVON Muro CNP on 8/25/2021 at 12:15 PM  Palliative Medicine   [phone]    August 25, 2021

## 2021-08-25 NOTE — PROGRESS NOTES
Patient alert disoriented x4, consumed 100% of breakfast vocally communicating needs stating \" I can't see I need my glasses. .. I'm in pain too\". Son will bring in glasses. Family still concerned about pain management. MD discussed holding off on home analgesic to prevent worsening AMS. Will administer tylenol for now.

## 2021-08-25 NOTE — PROGRESS NOTES
Hospitalist Progress Note      PCP: Christine Paredes    Date of Admission: 8/23/2021    Chief Complaint: Altered mental status    Hospital Course: The patient is a 80 y.o. female who presents to [facility] with dementia, acute cholecystitis     Patient resides at [address] [place] [address] Street: DNR CC     Patient was sent in from [facility] this evening for evaluation of some mild altered elevated mental status, variable heart rate, patient complaining of abdominal pain and low back pain, she was also found to have a low oxygen level of roughly 88%.     Recent hospital admission: 7/28 - 7/31: acute cholecystitis (nonoperative)     ED workup: Patient was accompanied by her daughter. She feels that her mother is at baseline. Patient was found to be afebrile. Heart rate 80s to 90s. EKG indicates LVH with trigeminy. Blood pressure stable. No evidence of hypoxia or respiratory distress. WBC is 13.6 with a left shift. UA is 4+ bacteria with a WBC of 83. Lactic acid is 2.7, blood cultures obtained. Patient was given IVF 30 mill per kilo. And admission was requested for UTI     On my exam:; Patient keeps wincing and when asked she points to her right upper quadrant for abdominal pain. Abdomen is a bit Marlen without rebound or guarding. She does have bowel sounds. She had no pain or tenderness on mid lower abdominal palpation or percussion. No evidence of diaphoresis. No evidence of mottling. Daughter expressed to me that the patient has been wincing and she will reach up and hold her right side of her abdomen and she has been belching quite a bit. Subjective:   Patient continues to be confused however seems more alert and responsive today, answers yes and no questions. Was unable to identify 2 sons were at bedside.        Medications:  Reviewed    Infusion Medications    sodium chloride      lactated ringers 100 mL/hr at 08/25/21 0650     Scheduled Medications    sodium chloride flush  5-40 mL IntraVENous 2 times per day    enoxaparin  30 mg Subcutaneous Daily    cefTRIAXone (ROCEPHIN) IV  1,000 mg IntraVENous Q24H     PRN Meds: sodium chloride flush, sodium chloride, acetaminophen **OR** acetaminophen      Intake/Output Summary (Last 24 hours) at 8/25/2021 1245  Last data filed at 8/25/2021 0944  Gross per 24 hour   Intake 2735.85 ml   Output --   Net 2735.85 ml       Physical Exam Performed:    BP (!) 176/73   Pulse 59   Temp 98 °F (36.7 °C) (Oral)   Resp 20   Ht 5' 1\" (1.549 m)   Wt 136 lb 3.9 oz (61.8 kg)   SpO2 94%   BMI 25.74 kg/m²     General appearance: Confused elderly female patient  HEENT: Pupils equal, round, and reactive to light. Conjunctivae/corneas clear. Neck: Supple, with full range of motion. No jugular venous distention. Trachea midline. Respiratory:  Normal respiratory effort. Clear to auscultation, bilaterally without Rales/Wheezes/Rhonchi. Cardiovascular: Regular rate and rhythm with normal S1/S2 without murmurs, rubs or gallops. Abdomen: Soft, non-tender, non-distended with normal bowel sounds. Musculoskeletal: Grimace and tenderness noted on palpation of suprapubic area, new tenderness noted on RUQ. skin: Skin color, texture, turgor normal.  No rashes or lesions. Neurologic:  Neurovascularly intact without any focal sensory/motor deficits.  Cranial nerves: II-XII intact, grossly non-focal.  Psychiatric: Confused   capillary Refill: Brisk,3 seconds, normal   Peripheral Pulses: +2 palpable, equal bilaterally       Labs:   Recent Labs     08/23/21 2039 08/24/21 0421 08/25/21  0741   WBC 13.6* 12.2* 10.5   HGB 13.5 13.2 12.8   HCT 39.0 39.0 37.8    353 313     Recent Labs     08/23/21 2039 08/24/21 0421 08/25/21  0741   * 133* 135*   K 4.6 4.1 3.9   CL 91* 91* 96*   CO2 26 26 25   BUN 29* 25* 13   CREATININE 1.1 0.7 0.5*   CALCIUM 11.0* 10.7* 9.7     Recent Labs     08/23/21 2039   AST 18   ALT 18   BILITOT 0.4   ALKPHOS 61     No results for input(s): INR in the last 72 hours. No results for input(s): Abdoulaye Clap in the last 72 hours. Urinalysis:      Lab Results   Component Value Date    NITRU Negative 08/23/2021    WBCUA 83 08/23/2021    BACTERIA 4+ 08/23/2021    RBCUA 3-4 08/23/2021    BLOODU MODERATE 08/23/2021    SPECGRAV 1.015 08/23/2021    GLUCOSEU Negative 08/23/2021       Radiology:  XR SHOULDER LEFT (MIN 2 VIEWS)   Final Result   Diffuse osteopenia with no acute bony abnormality. CT ABDOMEN PELVIS W IV CONTRAST Additional Contrast? None   Final Result   1. Stable appearance of bilateral adnexal cystic ovarian masses demonstrating   cystic versus septated cystic appearance with largest loculation on the left   continue to measure up to 7.7 cm in diameter. Continue to suggest follow-up   pelvic contrast enhanced MRI dedicated examination for further evaluation on   outpatient basis. 2. Mild bilateral pleural effusions. 3. Stable appearance of scattered hepatic cysts, with calcification involving   largest cyst near the hepatic dome. 4. Right renal multifocal small simple cysts. 5. Unremarkable appearance of the gallbladder on CT evaluation. Recommend   ultrasound gallbladder examination for further evaluation. 6. Moderate hiatal hernia. 7. Redemonstration of unchanged appearance of T11 and T12 remote vertebral   body compression fractures with T11 vertebral plana and central canal   stenosis. XR CHEST PORTABLE   Final Result   Low lung volumes. Otherwise, unremarkable single semi upright portable AP view of the chest.         US ABDOMEN LIMITED Specify organ? GALLBLADDER    (Results Pending)           Assessment/Plan:    Active Hospital Problems    Diagnosis     UTI (urinary tract infection) [N39.0]     Sepsis ([address] [A41.9]      Sepsis. Urinary tract infection.   Patient was brought to emergency because of altered mental status, was previously admitted with acute cholecystitis, CT abdomen with unremarkable appearance of the gallbladder. Patient has leukocytosis and tachycardia on presentation as well as lactic acidosis. Urine with WBC of 83 and leukocyte esterase. Patient was noted to have new right upper quadrant tenderness today, urine culture growing Aerococcus. Plan. -Ultrasound abdomen.   -We will hold off on de-escalation until ultrasound abdomen is resulted. -Follow blood culture.  -Continue ceftriaxone. Delirium. Likely in the setting of acute UTI. We will continue gentle reorientation. Goals of care. Patient's 2 sons have expressed that the patient may not want to continue to return to the hospital with acute issues, they expressed that they are interested in discussing issue further with hospice care, palliative team consult placed    DVT Prophylaxis: Lovenox  Diet: ADULT DIET; Dysphagia - Minced and Moist  Code Status: DNR-CC    PT/OT Eval Status:     Dispo - CLAUDIA.      Concepcion Martínez MD

## 2021-08-25 NOTE — CARE COORDINATION
The Plan for Transition of Care is related to the following treatment goals: return to HOSP [facility] with hospice    The Patient representative, Vitaliy Bland was provided with a choice of provider and agrees with the discharge plan. [x] Yes [] No    Freedom of choice list was provided with basic dialogue that supports the patient's individualized plan of care/goals, treatment preferences and shares the quality data associated with the providers. [x] Yes [] No    Electronically signed by Tami Bautista RN on 8/25/21 at 3:18 PM EDT    [facility] was chosen. Talked to Kyler Brown at [facility] & they will reach out to son, Vitaliy Bland, and get back with me. Dr. Rosalee Medeiros notified.     Electronically signed by Tami Bautista RN on 8/25/21 at 3:21 PM EDT

## 2021-08-25 NOTE — PLAN OF CARE
Problem: Pain:  Goal: Pain level will decrease  Description: Pain level will decrease  8/25/2021 0124 by Marlon Browning RN  Outcome: Ongoing  8/24/2021 1756 by Jovani Starr RN  Outcome: Met This Shift  Goal: Control of acute pain  Description: Control of acute pain  8/25/2021 0124 by Marlon Browning RN  Outcome: Ongoing  8/24/2021 1756 by Jovani Starr RN  Outcome: Met This Shift  Goal: Control of chronic pain  Description: Control of chronic pain  8/25/2021 0124 by Marlon Browning RN  Outcome: Ongoing  8/24/2021 1756 by Jovani Starr RN  Outcome: Met This Shift     Problem: Skin Integrity:  Goal: Will show no infection signs and symptoms  Description: Will show no infection signs and symptoms  8/25/2021 0124 by Marlon Browning RN  Outcome: Ongoing  8/24/2021 1756 by Jovani Starr RN  Outcome: Met This Shift  Goal: Absence of new skin breakdown  Description: Absence of new skin breakdown  8/25/2021 0124 by Marlon Browning RN  Outcome: Ongoing  8/24/2021 1756 by Jovani Starr RN  Outcome: Met This Shift     Problem: Falls - Risk of:  Goal: Will remain free from falls  Description: Will remain free from falls  8/25/2021 0124 by Marlon Browning RN  Outcome: Ongoing  8/24/2021 1756 by Jovani Starr RN  Outcome: Met This Shift  Goal: Absence of physical injury  Description: Absence of physical injury  8/25/2021 0124 by Marlon Browning RN  Outcome: Ongoing  8/24/2021 1756 by Jovani Starr RN  Outcome: Met This Shift

## 2021-08-25 NOTE — PLAN OF CARE
Problem: Pain:  Description: Pain management should include both nonpharmacologic and pharmacologic interventions. Goal: Pain level will decrease  Description: Pain level will decrease  8/25/2021 1321 by Gena Ward RN  Outcome: Met This Shift     Problem: Pain:  Description: Pain management should include both nonpharmacologic and pharmacologic interventions. Goal: Control of acute pain  Description: Control of acute pain  8/25/2021 1321 by Gena Ward RN  Outcome: Met This Shift     Problem: Pain:  Description: Pain management should include both nonpharmacologic and pharmacologic interventions.   Goal: Control of chronic pain  Description: Control of chronic pain  8/25/2021 1321 by Gena Ward RN  Outcome: Met This Shift     Problem: Skin Integrity:  Goal: Will show no infection signs and symptoms  Description: Will show no infection signs and symptoms  8/25/2021 1321 by Gena Ward RN  Outcome: Ongoing     Problem: Skin Integrity:  Goal: Absence of new skin breakdown  Description: Absence of new skin breakdown  8/25/2021 1321 by Gena Ward RN  Outcome: Ongoing     Problem: Falls - Risk of:  Goal: Will remain free from falls  Description: Will remain free from falls  8/25/2021 1321 by Gena Ward RN  Outcome: Met This Shift     Problem: Falls - Risk of:  Goal: Absence of physical injury  Description: Absence of physical injury  8/25/2021 1321 by Gena Ward RN  Outcome: Met This Shift

## 2021-08-26 VITALS
BODY MASS INDEX: 25.72 KG/M2 | HEIGHT: 61 IN | DIASTOLIC BLOOD PRESSURE: 65 MMHG | WEIGHT: 136.24 LBS | RESPIRATION RATE: 16 BRPM | OXYGEN SATURATION: 95 % | HEART RATE: 77 BPM | SYSTOLIC BLOOD PRESSURE: 158 MMHG | TEMPERATURE: 98.4 F

## 2021-08-26 PROBLEM — A41.9 SEPSIS (HCC): Status: RESOLVED | Noted: 2021-08-23 | Resolved: 2021-08-26

## 2021-08-26 PROCEDURE — 2580000003 HC RX 258: Performed by: NURSE PRACTITIONER

## 2021-08-26 PROCEDURE — 6360000002 HC RX W HCPCS: Performed by: NURSE PRACTITIONER

## 2021-08-26 PROCEDURE — 6360000002 HC RX W HCPCS: Performed by: STUDENT IN AN ORGANIZED HEALTH CARE EDUCATION/TRAINING PROGRAM

## 2021-08-26 PROCEDURE — 2580000003 HC RX 258: Performed by: CLINICAL NURSE SPECIALIST

## 2021-08-26 RX ORDER — LORAZEPAM 0.5 MG/1
0.5 TABLET ORAL 3 TIMES DAILY PRN
Qty: 12 TABLET | Refills: 0 | Status: SHIPPED | OUTPATIENT
Start: 2021-08-26 | End: 2021-09-25

## 2021-08-26 RX ORDER — AMOXICILLIN 250 MG/5ML
500 POWDER, FOR SUSPENSION ORAL 3 TIMES DAILY
Qty: 150 ML | Refills: 0 | Status: SHIPPED | OUTPATIENT
Start: 2021-08-26 | End: 2021-08-31

## 2021-08-26 RX ORDER — MORPHINE SULFATE 20 MG/5ML
10 SOLUTION ORAL EVERY 4 HOURS PRN
Qty: 1 BOTTLE | Refills: 0 | Status: SHIPPED | OUTPATIENT
Start: 2021-08-26 | End: 2021-08-29

## 2021-08-26 RX ADMIN — HYDROMORPHONE HYDROCHLORIDE 0.5 MG: 1 INJECTION, SOLUTION INTRAMUSCULAR; INTRAVENOUS; SUBCUTANEOUS at 10:09

## 2021-08-26 RX ADMIN — ENOXAPARIN SODIUM 30 MG: 30 INJECTION SUBCUTANEOUS at 10:09

## 2021-08-26 RX ADMIN — Medication 10 ML: at 10:09

## 2021-08-26 RX ADMIN — SODIUM CHLORIDE, POTASSIUM CHLORIDE, SODIUM LACTATE AND CALCIUM CHLORIDE: 600; 310; 30; 20 INJECTION, SOLUTION INTRAVENOUS at 10:11

## 2021-08-26 ASSESSMENT — PAIN SCALES - PAIN ASSESSMENT IN ADVANCED DEMENTIA (PAINAD)
CONSOLABILITY: 0
TOTALSCORE: 0
FACIALEXPRESSION: 0
BREATHING: 0
BODYLANGUAGE: 0
CONSOLABILITY: 0
BODYLANGUAGE: 0
BREATHING: 0
BODYLANGUAGE: 0
FACIALEXPRESSION: 0
CONSOLABILITY: 0
BREATHING: 0
NEGVOCALIZATION: 0
BODYLANGUAGE: 0
CONSOLABILITY: 0
NEGVOCALIZATION: 0
FACIALEXPRESSION: 0
BODYLANGUAGE: 0
NEGVOCALIZATION: 0
CONSOLABILITY: 0
NEGVOCALIZATION: 0
TOTALSCORE: 0
BODYLANGUAGE: 0
BREATHING: 0
NEGVOCALIZATION: 0
TOTALSCORE: 0
CONSOLABILITY: 0
FACIALEXPRESSION: 0
TOTALSCORE: 0
BREATHING: 0
NEGVOCALIZATION: 0
BREATHING: 0
CONSOLABILITY: 0
FACIALEXPRESSION: 0
TOTALSCORE: 0
BODYLANGUAGE: 0
NEGVOCALIZATION: 0
FACIALEXPRESSION: 0
BREATHING: 0
FACIALEXPRESSION: 0

## 2021-08-26 ASSESSMENT — PAIN SCALES - GENERAL
PAINLEVEL_OUTOF10: 0
PAINLEVEL_OUTOF10: 0
PAINLEVEL_OUTOF10: 8
PAINLEVEL_OUTOF10: 0

## 2021-08-26 ASSESSMENT — PAIN DESCRIPTION - PAIN TYPE: TYPE: ACUTE PAIN;CHRONIC PAIN

## 2021-08-26 ASSESSMENT — PAIN SCALES - WONG BAKER: WONGBAKER_NUMERICALRESPONSE: 0

## 2021-08-26 ASSESSMENT — PAIN DESCRIPTION - LOCATION: LOCATION: ARM;BACK

## 2021-08-26 NOTE — PROGRESS NOTES
This RN prepared pt for discharge to SNF/Hospice at 1418. This RN provided discharge education regarding medication regimen, and care to [facility] nurse. Pt. Verbalized understanding. Denies questions. No LDAs present at discharge. Discontinued IV without complications. Pt. tolerated well. Pt is being transported [facility].

## 2021-08-26 NOTE — DISCHARGE SUMMARY
Hospital Medicine Discharge Summary    Patient ID: Sal Mcnamara      Patient's PCP: Suki Ozuna Date: 8/23/2021     Discharge Date:   08/26/21    Admitting Physician: Eliott Favre, DO     Discharge Physician: Susie Holley MD     Discharge Diagnoses: Active Hospital Problems    Diagnosis     UTI (urinary tract infection) [N39.0]        The patient was seen and examined on day of discharge and this discharge summary is in conjunction with any daily progress note from day of discharge. Hospital Course: The patient is a 80 y. o. female who [facility] to [facility] with dementia, acute cholecystitis    Patient was accompanied by her daughter. Emmanuelle Leonard feels that her mother is at baseline.  Patient was found to be afebrile.  Heart rate 80s to 90s.  EKG indicates LVH with trigeminy.  Blood pressure stable.  No evidence of hypoxia or respiratory distress.  WBC is 13.6 with a left shift.  UA is 4+ bacteria with a WBC of 83.  Lactic acid is 2.7, blood cultures obtained.  Patient was given IVF 30 mill per kilo.  And admission was requested for UTI    Upon further discussion with patient's family, patient was switched to comfort care, and will be discharged on hospice care. Physical Exam Performed:     BP (!) 158/65   Pulse 77   Temp 98.4 °F (36.9 °C) (Oral)   Resp 16   Ht 5' 1\" (1.549 m)   Wt 136 lb 3.9 oz (61.8 kg)   SpO2 95%   BMI 25.74 kg/m²     General appearance: Confused elderly female patient  HEENT: Pupils equal, round, and reactive to light. Conjunctivae/corneas clear. Neck: Supple, with full range of motion. No jugular venous distention. Trachea midline. Respiratory:  Normal respiratory effort. Clear to auscultation, bilaterally without Rales/Wheezes/Rhonchi. Cardiovascular: Regular rate and rhythm with normal S1/S2 without murmurs, rubs or gallops. Abdomen: Soft, non-tender, non-distended with normal bowel sounds.   Musculoskeletal: Grimace and tenderness noted on palpation of suprapubic area, new tenderness noted on RUQ. skin: Skin color, texture, turgor normal.  No rashes or lesions. Neurologic:  Neurovascularly intact without any focal sensory/motor deficits. Cranial nerves: II-XII intact, grossly non-focal.  Psychiatric: Confused   capillary Refill: Brisk,3 seconds, normal   Peripheral Pulses: +2 palpable, equal bilaterally   Labs: For convenience and continuity at follow-up the following most recent labs are provided:      CBC:    Lab Results   Component Value Date    WBC 10.5 08/25/2021    HGB 12.8 08/25/2021    HCT 37.8 08/25/2021     08/25/2021       Renal:    Lab Results   Component Value Date     08/25/2021    K 3.9 08/25/2021    CL 96 08/25/2021    CO2 25 08/25/2021    BUN 13 08/25/2021    CREATININE 0.5 08/25/2021    CALCIUM 9.7 08/25/2021         Significant Diagnostic Studies    Radiology:   XR SHOULDER LEFT (MIN 2 VIEWS)   Final Result   Diffuse osteopenia with no acute bony abnormality. CT ABDOMEN PELVIS W IV CONTRAST Additional Contrast? None   Final Result   1. Stable appearance of bilateral adnexal cystic ovarian masses demonstrating   cystic versus septated cystic appearance with largest loculation on the left   continue to measure up to 7.7 cm in diameter. Continue to suggest follow-up   pelvic contrast enhanced MRI dedicated examination for further evaluation on   outpatient basis. 2. Mild bilateral pleural effusions. 3. Stable appearance of scattered hepatic cysts, with calcification involving   largest cyst near the hepatic dome. 4. Right renal multifocal small simple cysts. 5. Unremarkable appearance of the gallbladder on CT evaluation. Recommend   ultrasound gallbladder examination for further evaluation. 6. Moderate hiatal hernia. 7. Redemonstration of unchanged appearance of T11 and T12 remote vertebral   body compression fractures with T11 vertebral plana and central canal   stenosis.          XR CHEST PORTABLE   Final Result   Low lung volumes. Otherwise, unremarkable single semi upright portable AP view of the chest.                Consults:     IP CONSULT TO PALLIATIVE CARE  IP CONSULT TO HOSPICE    Disposition: Hospice    Condition at Discharge: Terminal    Discharge Instructions/Follow-up:    -Patient was started on Roxanol and lorazepam for comfort,  -Transfer to hospice facility    Code Status:  DNR-CC     Activity: activity as tolerated    Diet: regular diet      Discharge Medications:     Current Discharge Medication List           Details   morphine 20 MG/5ML solution Take 2.5 mLs by mouth every 4 hours as needed for Pain for up to 3 days. Qty: 1 Bottle, Refills: 0    Comments: Reduce doses taken as pain becomes manageable  Associated Diagnoses: Hospice care      LORazepam (ATIVAN) 0.5 MG tablet Take 1 tablet by mouth 3 times daily as needed for Anxiety for up to 30 days. Qty: 12 tablet, Refills: 0    Associated Diagnoses: Hospice care      amoxicillin (AMOXIL) 250 MG/5ML suspension Take 10 mLs by mouth 3 times daily for 5 days  Qty: 150 mL, Refills: 0              Details   docusate sodium (COLACE, DULCOLAX) 100 MG CAPS Take 100 mg by mouth 2 times daily  Qty: 60 capsule, Refills: 0      cetirizine (ZYRTEC) 10 MG tablet Take 10 mg by mouth daily      acetaminophen (TYLENOL) 500 MG tablet Take 1,000 mg by mouth every 8 hours as needed for Pain      dimenhyDRINATE (DRAMAMINE) 50 MG tablet Take 50 mg by mouth once Daily as needed for motion sickness      fluticasone (VERAMYST) 27.5 MCG/SPRAY nasal spray 1 spray by Each Nostril route daily             Time Spent on discharge is more than 30 minutes in the examination, evaluation, counseling and review of medications and discharge plan. Signed:    An Quiroz MD   8/26/2021      Thank you Ryan Starr for the opportunity to be involved in this patient's care.  If you have any questions or concerns please feel free to contact me at [phone].

## 2021-08-26 NOTE — PROGRESS NOTES
Speech Language Pathology    Patient discussed with RN. RN confirms plan for hospice care at this time. Discontinue skilled ST. Please reconsult should need re-arise. Thank you. Stacey L. Paddy Apley, [address] [facility], [id number]  Speech-Language Pathologist  Portable phone: [phone]

## 2021-08-26 NOTE — PLAN OF CARE
Problem: Pain:  Goal: Pain level will decrease  Description: Pain level will decrease  8/26/2021 0226 by Shaun Pierre RN  Outcome: Ongoing  8/25/2021 1321 by Tesfaye Wilcox RN  Outcome: Met This Shift  Goal: Control of acute pain  Description: Control of acute pain  8/26/2021 0226 by Shaun Pierre RN  Outcome: Ongoing  8/25/2021 1321 by Tesfaye Wilcox RN  Outcome: Met This Shift  Goal: Control of chronic pain  Description: Control of chronic pain  8/26/2021 0226 by Shaun Pierre RN  Outcome: Ongoing  8/25/2021 1321 by Tesfaye Wilcox RN  Outcome: Met This Shift     Problem: Skin Integrity:  Goal: Will show no infection signs and symptoms  Description: Will show no infection signs and symptoms  8/26/2021 0226 by Shaun Pierre RN  Outcome: Ongoing  8/25/2021 1321 by Tesfaye Wilcox RN  Outcome: Ongoing  Goal: Absence of new skin breakdown  Description: Absence of new skin breakdown  8/26/2021 0226 by Shaun Pierre RN  Outcome: Ongoing  8/25/2021 1321 by Tesfaye Wilcox RN  Outcome: Ongoing     Problem: Falls - Risk of:  Goal: Will remain free from falls  Description: Will remain free from falls  8/26/2021 0226 by Shaun Pierre RN  Outcome: Ongoing  8/25/2021 1321 by Tesfaye Wilcox RN  Outcome: Met This Shift  Goal: Absence of physical injury  Description: Absence of physical injury  8/26/2021 0226 by Shaun Pierre RN  Outcome: Ongoing  8/25/2021 1321 by Tesfaye Wilcox RN  Outcome: Met This Shift

## 2021-08-26 NOTE — CARE COORDINATION
CASE MANAGEMENT DISCHARGE SUMMARY:    DISCHARGE DATE: 8/26/21    Discharging to Facility/ Agency   · Name: [facility]  · Address:  [address] [place], [place] [address]ise EverlyUmesh, [address] [place] [address]  · Phone:  [phone]  · Fax:  [phone]     [facility] accepting. TRANSPORTATION: [facility]             TIME: 1500    Family agreeable to dc today back to [facility] with [facility].     Delfin Russ RN, BSN, Case Management  [phone]    Electronically signed by Delfin Russ RN on 8/26/2021 at 11:53 AM

## 2021-08-27 LAB — BLOOD CULTURE, ROUTINE: NORMAL

## 2021-08-28 LAB — CULTURE, BLOOD 2: NORMAL

## 2021-09-25 PROBLEM — N39.0 UTI (URINARY TRACT INFECTION): Status: RESOLVED | Noted: 2021-08-24 | Resolved: 2021-09-25
